# Patient Record
Sex: FEMALE | Race: WHITE | NOT HISPANIC OR LATINO | URBAN - METROPOLITAN AREA
[De-identification: names, ages, dates, MRNs, and addresses within clinical notes are randomized per-mention and may not be internally consistent; named-entity substitution may affect disease eponyms.]

---

## 2017-04-26 ENCOUNTER — OUTPATIENT (OUTPATIENT)
Dept: OUTPATIENT SERVICES | Facility: HOSPITAL | Age: 67
LOS: 1 days | Discharge: HOME | End: 2017-04-26

## 2017-04-26 DIAGNOSIS — D72.89 OTHER SPECIFIED DISORDERS OF WHITE BLOOD CELLS: ICD-10-CM

## 2017-04-30 ENCOUNTER — RESULT REVIEW (OUTPATIENT)
Age: 67
End: 2017-04-30

## 2017-05-01 ENCOUNTER — RESULT REVIEW (OUTPATIENT)
Age: 67
End: 2017-05-01

## 2017-05-01 ENCOUNTER — OUTPATIENT (OUTPATIENT)
Dept: OUTPATIENT SERVICES | Facility: HOSPITAL | Age: 67
LOS: 1 days | End: 2017-05-01
Payer: MEDICARE

## 2017-05-01 ENCOUNTER — APPOINTMENT (OUTPATIENT)
Dept: HEMATOLOGY ONCOLOGY | Facility: CLINIC | Age: 67
End: 2017-05-01

## 2017-05-01 ENCOUNTER — LABORATORY RESULT (OUTPATIENT)
Age: 67
End: 2017-05-01

## 2017-05-01 VITALS
RESPIRATION RATE: 16 BRPM | WEIGHT: 117.95 LBS | SYSTOLIC BLOOD PRESSURE: 122 MMHG | DIASTOLIC BLOOD PRESSURE: 60 MMHG | HEART RATE: 56 BPM | BODY MASS INDEX: 20.9 KG/M2 | TEMPERATURE: 97.7 F | OXYGEN SATURATION: 100 % | HEIGHT: 63.03 IN

## 2017-05-01 DIAGNOSIS — D72.89 OTHER SPECIFIED DISORDERS OF WHITE BLOOD CELLS: ICD-10-CM

## 2017-05-01 PROCEDURE — 88189 FLOWCYTOMETRY/READ 16 & >: CPT

## 2017-05-02 DIAGNOSIS — D72.829 ELEVATED WHITE BLOOD CELL COUNT, UNSPECIFIED: ICD-10-CM

## 2017-05-04 LAB — TM INTERPRETATION: SIGNIFICANT CHANGE UP

## 2017-05-11 ENCOUNTER — RESULT REVIEW (OUTPATIENT)
Age: 67
End: 2017-05-11

## 2017-05-11 ENCOUNTER — APPOINTMENT (OUTPATIENT)
Dept: HEMATOLOGY ONCOLOGY | Facility: CLINIC | Age: 67
End: 2017-05-11

## 2017-05-11 VITALS
OXYGEN SATURATION: 99 % | WEIGHT: 117.95 LBS | DIASTOLIC BLOOD PRESSURE: 60 MMHG | HEART RATE: 62 BPM | RESPIRATION RATE: 16 BRPM | SYSTOLIC BLOOD PRESSURE: 110 MMHG | BODY MASS INDEX: 20.87 KG/M2 | TEMPERATURE: 97.8 F

## 2017-05-11 LAB
BASOPHILS # BLD AUTO: 0.2 K/UL — SIGNIFICANT CHANGE UP (ref 0–0.2)
EOSINOPHIL # BLD AUTO: 0.2 K/UL — SIGNIFICANT CHANGE UP (ref 0–0.5)
HCT VFR BLD CALC: 39.5 % — SIGNIFICANT CHANGE UP (ref 34.5–45)
HGB BLD-MCNC: 13.1 G/DL — SIGNIFICANT CHANGE UP (ref 11.5–15.5)
LYMPHOCYTES # BLD AUTO: 56 % — HIGH (ref 13–44)
LYMPHOCYTES # BLD AUTO: 7.2 K/UL — HIGH (ref 1–3.3)
MCHC RBC-ENTMCNC: 29.5 PG — SIGNIFICANT CHANGE UP (ref 27–34)
MCHC RBC-ENTMCNC: 33.2 G/DL — SIGNIFICANT CHANGE UP (ref 32–36)
MCV RBC AUTO: 88.9 FL — SIGNIFICANT CHANGE UP (ref 80–100)
MONOCYTES # BLD AUTO: 0.6 K/UL — SIGNIFICANT CHANGE UP (ref 0–0.9)
MONOCYTES NFR BLD AUTO: 2 % — SIGNIFICANT CHANGE UP (ref 2–14)
NEUTROPHILS # BLD AUTO: 3.7 K/UL — SIGNIFICANT CHANGE UP (ref 1.8–7.4)
NEUTROPHILS NFR BLD AUTO: 40 % — LOW (ref 43–77)
PLAT MORPH BLD: NORMAL — SIGNIFICANT CHANGE UP
PLATELET # BLD AUTO: 186 K/UL — SIGNIFICANT CHANGE UP (ref 150–400)
RBC # BLD: 4.45 M/UL — SIGNIFICANT CHANGE UP (ref 3.8–5.2)
RBC # FLD: 12.7 % — SIGNIFICANT CHANGE UP (ref 10.3–14.5)
RBC BLD AUTO: NORMAL — SIGNIFICANT CHANGE UP
VARIANT LYMPHS # BLD: 2 % — SIGNIFICANT CHANGE UP (ref 0–6)
WBC # BLD: 11.8 K/UL — HIGH (ref 3.8–10.5)
WBC # FLD AUTO: 11.8 K/UL — HIGH (ref 3.8–10.5)

## 2017-05-11 PROCEDURE — 88184 FLOWCYTOMETRY/ TC 1 MARKER: CPT

## 2017-05-11 PROCEDURE — 88185 FLOWCYTOMETRY/TC ADD-ON: CPT

## 2017-05-11 PROCEDURE — 88275 CYTOGENETICS 100-300: CPT

## 2017-05-11 PROCEDURE — 88237 TISSUE CULTURE BONE MARROW: CPT

## 2017-05-11 PROCEDURE — 88271 CYTOGENETICS DNA PROBE: CPT

## 2017-05-19 LAB — CHROM ANALY INTERPHASE BLD FISH-IMP: SIGNIFICANT CHANGE UP

## 2017-05-23 ENCOUNTER — OUTPATIENT (OUTPATIENT)
Dept: OUTPATIENT SERVICES | Facility: HOSPITAL | Age: 67
LOS: 1 days | Discharge: HOME | End: 2017-05-23

## 2017-05-23 DIAGNOSIS — D72.829 ELEVATED WHITE BLOOD CELL COUNT, UNSPECIFIED: ICD-10-CM

## 2017-05-24 ENCOUNTER — RESULT REVIEW (OUTPATIENT)
Age: 67
End: 2017-05-24

## 2017-05-24 ENCOUNTER — APPOINTMENT (OUTPATIENT)
Dept: HEMATOLOGY ONCOLOGY | Facility: CLINIC | Age: 67
End: 2017-05-24

## 2017-05-24 VITALS
HEART RATE: 62 BPM | SYSTOLIC BLOOD PRESSURE: 110 MMHG | TEMPERATURE: 98 F | DIASTOLIC BLOOD PRESSURE: 70 MMHG | RESPIRATION RATE: 16 BRPM | WEIGHT: 119.05 LBS | BODY MASS INDEX: 21.07 KG/M2 | OXYGEN SATURATION: 100 %

## 2017-05-24 LAB
HCT VFR BLD CALC: 37.5 % — SIGNIFICANT CHANGE UP (ref 34.5–45)
HGB BLD-MCNC: 12.7 G/DL — SIGNIFICANT CHANGE UP (ref 11.5–15.5)
MCHC RBC-ENTMCNC: 30 PG — SIGNIFICANT CHANGE UP (ref 27–34)
MCHC RBC-ENTMCNC: 33.9 G/DL — SIGNIFICANT CHANGE UP (ref 32–36)
MCV RBC AUTO: 88.3 FL — SIGNIFICANT CHANGE UP (ref 80–100)
PLATELET # BLD AUTO: 199 K/UL — SIGNIFICANT CHANGE UP (ref 150–400)
RBC # BLD: 4.25 M/UL — SIGNIFICANT CHANGE UP (ref 3.8–5.2)
RBC # FLD: 12.6 % — SIGNIFICANT CHANGE UP (ref 10.3–14.5)
WBC # BLD: 10.4 K/UL — SIGNIFICANT CHANGE UP (ref 3.8–10.5)
WBC # FLD AUTO: 10.4 K/UL — SIGNIFICANT CHANGE UP (ref 3.8–10.5)

## 2017-06-13 ENCOUNTER — APPOINTMENT (OUTPATIENT)
Dept: ENDOCRINOLOGY | Facility: CLINIC | Age: 67
End: 2017-06-13

## 2017-06-13 VITALS
DIASTOLIC BLOOD PRESSURE: 70 MMHG | BODY MASS INDEX: 21.26 KG/M2 | SYSTOLIC BLOOD PRESSURE: 122 MMHG | HEIGHT: 63 IN | WEIGHT: 120 LBS | HEART RATE: 76 BPM | OXYGEN SATURATION: 98 %

## 2017-11-22 ENCOUNTER — OUTPATIENT (OUTPATIENT)
Dept: OUTPATIENT SERVICES | Facility: HOSPITAL | Age: 67
LOS: 1 days | Discharge: HOME | End: 2017-11-22

## 2017-11-22 DIAGNOSIS — D72.829 ELEVATED WHITE BLOOD CELL COUNT, UNSPECIFIED: ICD-10-CM

## 2017-11-29 ENCOUNTER — RESULT REVIEW (OUTPATIENT)
Age: 67
End: 2017-11-29

## 2017-11-29 ENCOUNTER — APPOINTMENT (OUTPATIENT)
Dept: HEMATOLOGY ONCOLOGY | Facility: CLINIC | Age: 67
End: 2017-11-29
Payer: MEDICARE

## 2017-11-29 VITALS
DIASTOLIC BLOOD PRESSURE: 70 MMHG | BODY MASS INDEX: 21.01 KG/M2 | RESPIRATION RATE: 16 BRPM | WEIGHT: 118.61 LBS | SYSTOLIC BLOOD PRESSURE: 120 MMHG | HEART RATE: 74 BPM | OXYGEN SATURATION: 98 % | TEMPERATURE: 98.4 F

## 2017-11-29 LAB
HCT VFR BLD CALC: 41 % — SIGNIFICANT CHANGE UP (ref 34.5–45)
HGB BLD-MCNC: 13.5 G/DL — SIGNIFICANT CHANGE UP (ref 11.5–15.5)
MCHC RBC-ENTMCNC: 29.1 PG — SIGNIFICANT CHANGE UP (ref 27–34)
MCHC RBC-ENTMCNC: 32.8 G/DL — SIGNIFICANT CHANGE UP (ref 32–36)
MCV RBC AUTO: 88.5 FL — SIGNIFICANT CHANGE UP (ref 80–100)
PLATELET # BLD AUTO: 186 K/UL — SIGNIFICANT CHANGE UP (ref 150–400)
RBC # BLD: 4.63 M/UL — SIGNIFICANT CHANGE UP (ref 3.8–5.2)
RBC # FLD: 13.2 % — SIGNIFICANT CHANGE UP (ref 10.3–14.5)
WBC # BLD: 11.5 K/UL — HIGH (ref 3.8–10.5)
WBC # FLD AUTO: 11.5 K/UL — HIGH (ref 3.8–10.5)

## 2017-11-29 PROCEDURE — 99215 OFFICE O/P EST HI 40 MIN: CPT

## 2017-11-30 LAB
ALBUMIN SERPL ELPH-MCNC: 4.4 G/DL
ALP BLD-CCNC: 94 U/L
ALT SERPL-CCNC: 74 U/L
ANION GAP SERPL CALC-SCNC: 11 MMOL/L
AST SERPL-CCNC: 58 U/L
BILIRUB SERPL-MCNC: 1.4 MG/DL
BUN SERPL-MCNC: 14 MG/DL
CALCIUM SERPL-MCNC: 9.5 MG/DL
CHLORIDE SERPL-SCNC: 102 MMOL/L
CO2 SERPL-SCNC: 28 MMOL/L
CREAT SERPL-MCNC: 0.74 MG/DL
GLUCOSE SERPL-MCNC: 86 MG/DL
LDH SERPL-CCNC: 226 U/L
POTASSIUM SERPL-SCNC: 4.3 MMOL/L
PROT SERPL-MCNC: 7.3 G/DL
SODIUM SERPL-SCNC: 141 MMOL/L

## 2018-02-26 ENCOUNTER — APPOINTMENT (OUTPATIENT)
Dept: HEMATOLOGY ONCOLOGY | Facility: CLINIC | Age: 68
End: 2018-02-26
Payer: MEDICARE

## 2018-02-26 VITALS
OXYGEN SATURATION: 98 % | SYSTOLIC BLOOD PRESSURE: 120 MMHG | TEMPERATURE: 97.8 F | WEIGHT: 121 LBS | RESPIRATION RATE: 14 BRPM | DIASTOLIC BLOOD PRESSURE: 72 MMHG | BODY MASS INDEX: 21.44 KG/M2 | HEART RATE: 72 BPM | HEIGHT: 63 IN

## 2018-02-26 PROCEDURE — 85025 COMPLETE CBC W/AUTO DIFF WBC: CPT

## 2018-02-26 PROCEDURE — 99215 OFFICE O/P EST HI 40 MIN: CPT

## 2018-02-27 LAB
ALBUMIN SERPL ELPH-MCNC: 4.5 G/DL
ALP BLD-CCNC: 93 U/L
ALT SERPL-CCNC: 23 U/L
ANION GAP SERPL CALC-SCNC: 12 MMOL/L
AST SERPL-CCNC: 24 U/L
BILIRUB SERPL-MCNC: 0.5 MG/DL
BUN SERPL-MCNC: 19 MG/DL
CALCIUM SERPL-MCNC: 9.5 MG/DL
CHLORIDE SERPL-SCNC: 102 MMOL/L
CO2 SERPL-SCNC: 29 MMOL/L
CREAT SERPL-MCNC: 0.73 MG/DL
DIRECT COOMBS: NORMAL
GLUCOSE SERPL-MCNC: 92 MG/DL
HAV IGG+IGM SER QL: REACTIVE
HBV CORE IGG+IGM SER QL: NONREACTIVE
HBV CORE IGM SER QL: NONREACTIVE
HBV SURFACE AB SER QL: NONREACTIVE
HBV SURFACE AG SER QL: NONREACTIVE
HCV AB SER QL: NONREACTIVE
HCV S/CO RATIO: 0.12 S/CO
LDH SERPL-CCNC: 166 U/L
POTASSIUM SERPL-SCNC: 4 MMOL/L
PROT SERPL-MCNC: 7.3 G/DL
RBC # BLD: 4.83 M/UL
RETICS # AUTO: 1.2 %
RETICS AGGREG/RBC NFR: 59.4 K/UL
SODIUM SERPL-SCNC: 143 MMOL/L
URATE SERPL-MCNC: 3.7 MG/DL

## 2018-02-28 LAB
ALBUMIN MFR SERPL ELPH: 60.7 %
ALBUMIN SERPL-MCNC: 4.4 G/DL
ALBUMIN/GLOB SERPL: 1.6 RATIO
ALPHA1 GLOB MFR SERPL ELPH: 4.4 %
ALPHA1 GLOB SERPL ELPH-MCNC: 0.3 G/DL
ALPHA2 GLOB MFR SERPL ELPH: 10.1 %
ALPHA2 GLOB SERPL ELPH-MCNC: 0.7 G/DL
B-GLOBULIN MFR SERPL ELPH: 9.8 %
B-GLOBULIN SERPL ELPH-MCNC: 0.7 G/DL
DEPRECATED KAPPA LC FREE/LAMBDA SER: 0.42 RATIO
DEPRECATED KAPPA LC FREE/LAMBDA SER: 0.45 RATIO
GAMMA GLOB FLD ELPH-MCNC: 1.1 G/DL
GAMMA GLOB MFR SERPL ELPH: 15 %
IGA SER QL IEP: 103 MG/DL
IGA SER QL IEP: 103 MG/DL
IGG SER QL IEP: 1060 MG/DL
IGG SER QL IEP: 1070 MG/DL
IGM SER QL IEP: 87 MG/DL
IGM SER QL IEP: 92 MG/DL
INTERPRETATION SERPL IEP-IMP: NORMAL
KAPPA LC CSF-MCNC: 3.32 MG/DL
KAPPA LC CSF-MCNC: 3.56 MG/DL
KAPPA LC SERPL-MCNC: 1.48 MG/DL
KAPPA LC SERPL-MCNC: 1.5 MG/DL
M PROTEIN MFR SERPL ELPH: 8.3 %
M PROTEIN SPEC IFE-MCNC: NORMAL
MONOCLON BAND OBS SERPL: 0.6 G/DL
PROT SERPL-MCNC: 7.2 G/DL
PROT SERPL-MCNC: 7.2 G/DL

## 2018-05-11 ENCOUNTER — OUTPATIENT (OUTPATIENT)
Dept: OUTPATIENT SERVICES | Facility: HOSPITAL | Age: 68
LOS: 1 days | Discharge: HOME | End: 2018-05-11

## 2018-05-11 DIAGNOSIS — D72.829 ELEVATED WHITE BLOOD CELL COUNT, UNSPECIFIED: ICD-10-CM

## 2018-05-16 ENCOUNTER — RESULT REVIEW (OUTPATIENT)
Age: 68
End: 2018-05-16

## 2018-05-16 ENCOUNTER — APPOINTMENT (OUTPATIENT)
Dept: HEMATOLOGY ONCOLOGY | Facility: CLINIC | Age: 68
End: 2018-05-16
Payer: MEDICARE

## 2018-05-16 VITALS
RESPIRATION RATE: 16 BRPM | SYSTOLIC BLOOD PRESSURE: 113 MMHG | OXYGEN SATURATION: 100 % | DIASTOLIC BLOOD PRESSURE: 73 MMHG | BODY MASS INDEX: 21.05 KG/M2 | WEIGHT: 118.83 LBS | TEMPERATURE: 98.4 F | HEART RATE: 59 BPM

## 2018-05-16 LAB
BASOPHILS # BLD AUTO: 0.2 K/UL — SIGNIFICANT CHANGE UP (ref 0–0.2)
BASOPHILS NFR BLD AUTO: 1 % — SIGNIFICANT CHANGE UP (ref 0–2)
EOSINOPHIL # BLD AUTO: 0.1 K/UL — SIGNIFICANT CHANGE UP (ref 0–0.5)
EOSINOPHIL NFR BLD AUTO: 2 % — SIGNIFICANT CHANGE UP (ref 0–6)
HCT VFR BLD CALC: 42 % — SIGNIFICANT CHANGE UP (ref 34.5–45)
HGB BLD-MCNC: 14 G/DL — SIGNIFICANT CHANGE UP (ref 11.5–15.5)
LYMPHOCYTES # BLD AUTO: 58 % — HIGH (ref 13–44)
LYMPHOCYTES # BLD AUTO: 7.1 K/UL — HIGH (ref 1–3.3)
MCHC RBC-ENTMCNC: 29.6 PG — SIGNIFICANT CHANGE UP (ref 27–34)
MCHC RBC-ENTMCNC: 33.4 G/DL — SIGNIFICANT CHANGE UP (ref 32–36)
MCV RBC AUTO: 88.6 FL — SIGNIFICANT CHANGE UP (ref 80–100)
MONOCYTES # BLD AUTO: 0.5 K/UL — SIGNIFICANT CHANGE UP (ref 0–0.9)
MONOCYTES NFR BLD AUTO: 7 % — SIGNIFICANT CHANGE UP (ref 2–14)
NEUTROPHILS # BLD AUTO: 3.1 K/UL — SIGNIFICANT CHANGE UP (ref 1.8–7.4)
NEUTROPHILS NFR BLD AUTO: 32 % — LOW (ref 43–77)
PLAT MORPH BLD: NORMAL — SIGNIFICANT CHANGE UP
PLATELET # BLD AUTO: 185 K/UL — SIGNIFICANT CHANGE UP (ref 150–400)
RBC # BLD: 4.74 M/UL — SIGNIFICANT CHANGE UP (ref 3.8–5.2)
RBC # FLD: 12.3 % — SIGNIFICANT CHANGE UP (ref 10.3–14.5)
RBC BLD AUTO: NORMAL — SIGNIFICANT CHANGE UP
WBC # BLD: 11 K/UL — HIGH (ref 3.8–10.5)
WBC # FLD AUTO: 11 K/UL — HIGH (ref 3.8–10.5)

## 2018-05-16 PROCEDURE — 99215 OFFICE O/P EST HI 40 MIN: CPT

## 2018-05-17 LAB
ALBUMIN SERPL ELPH-MCNC: 4.6 G/DL
ALP BLD-CCNC: 74 U/L
ALT SERPL-CCNC: 17 U/L
ANION GAP SERPL CALC-SCNC: 14 MMOL/L
AST SERPL-CCNC: 25 U/L
BILIRUB SERPL-MCNC: 0.8 MG/DL
BUN SERPL-MCNC: 18 MG/DL
CALCIUM SERPL-MCNC: 9.6 MG/DL
CHLORIDE SERPL-SCNC: 103 MMOL/L
CO2 SERPL-SCNC: 27 MMOL/L
CREAT SERPL-MCNC: 0.85 MG/DL
GLUCOSE SERPL-MCNC: 104 MG/DL
LDH SERPL-CCNC: 160 U/L
POTASSIUM SERPL-SCNC: 4.6 MMOL/L
PROT SERPL-MCNC: 7.2 G/DL
SODIUM SERPL-SCNC: 144 MMOL/L

## 2018-06-25 ENCOUNTER — LABORATORY RESULT (OUTPATIENT)
Age: 68
End: 2018-06-25

## 2018-06-25 ENCOUNTER — APPOINTMENT (OUTPATIENT)
Dept: ENDOCRINOLOGY | Facility: CLINIC | Age: 68
End: 2018-06-25
Payer: MEDICARE

## 2018-06-25 VITALS
HEIGHT: 63 IN | OXYGEN SATURATION: 98 % | SYSTOLIC BLOOD PRESSURE: 100 MMHG | BODY MASS INDEX: 20.55 KG/M2 | WEIGHT: 116 LBS | HEART RATE: 64 BPM | DIASTOLIC BLOOD PRESSURE: 64 MMHG

## 2018-06-25 PROCEDURE — 77080 DXA BONE DENSITY AXIAL: CPT | Mod: GA

## 2018-06-25 PROCEDURE — 99214 OFFICE O/P EST MOD 30 MIN: CPT | Mod: 25

## 2018-06-27 LAB
25(OH)D3 SERPL-MCNC: 45.6 NG/ML
ALBUMIN SERPL ELPH-MCNC: 4.4 G/DL
ALP BLD-CCNC: 75 U/L
ALT SERPL-CCNC: 19 U/L
ANION GAP SERPL CALC-SCNC: 14 MMOL/L
AST SERPL-CCNC: 26 U/L
BILIRUB SERPL-MCNC: 0.6 MG/DL
BUN SERPL-MCNC: 18 MG/DL
CALCIUM SERPL-MCNC: 9.6 MG/DL
CHLORIDE SERPL-SCNC: 104 MMOL/L
CO2 SERPL-SCNC: 28 MMOL/L
CREAT SERPL-MCNC: 0.82 MG/DL
GLUCOSE SERPL-MCNC: 102 MG/DL
POTASSIUM SERPL-SCNC: 4.7 MMOL/L
PROT SERPL-MCNC: 7.6 G/DL
SODIUM SERPL-SCNC: 146 MMOL/L
T3RU NFR SERPL: 1.04 INDEX
T4 SERPL-MCNC: 8.8 UG/DL
TSH SERPL-ACNC: 1.89 UIU/ML

## 2018-11-19 ENCOUNTER — OUTPATIENT (OUTPATIENT)
Dept: OUTPATIENT SERVICES | Facility: HOSPITAL | Age: 68
LOS: 1 days | Discharge: HOME | End: 2018-11-19

## 2018-11-19 DIAGNOSIS — D72.829 ELEVATED WHITE BLOOD CELL COUNT, UNSPECIFIED: ICD-10-CM

## 2018-11-29 ENCOUNTER — APPOINTMENT (OUTPATIENT)
Dept: HEMATOLOGY ONCOLOGY | Facility: CLINIC | Age: 68
End: 2018-11-29
Payer: MEDICARE

## 2018-11-29 ENCOUNTER — RESULT REVIEW (OUTPATIENT)
Age: 68
End: 2018-11-29

## 2018-11-29 VITALS
WEIGHT: 115.08 LBS | SYSTOLIC BLOOD PRESSURE: 118 MMHG | HEART RATE: 81 BPM | DIASTOLIC BLOOD PRESSURE: 70 MMHG | RESPIRATION RATE: 16 BRPM | OXYGEN SATURATION: 98 % | BODY MASS INDEX: 20.39 KG/M2 | TEMPERATURE: 97.8 F

## 2018-11-29 LAB
ALBUMIN SERPL ELPH-MCNC: 4.7 G/DL
ALP BLD-CCNC: 90 U/L
ALT SERPL-CCNC: 19 U/L
ANION GAP SERPL CALC-SCNC: 12 MMOL/L
AST SERPL-CCNC: 25 U/L
BILIRUB SERPL-MCNC: 0.8 MG/DL
BUN SERPL-MCNC: 18 MG/DL
CALCIUM SERPL-MCNC: 10.1 MG/DL
CHLORIDE SERPL-SCNC: 104 MMOL/L
CO2 SERPL-SCNC: 28 MMOL/L
CREAT SERPL-MCNC: 0.9 MG/DL
GLUCOSE SERPL-MCNC: 73 MG/DL
HCT VFR BLD CALC: 42.1 % — SIGNIFICANT CHANGE UP (ref 34.5–45)
HGB BLD-MCNC: 13.4 G/DL — SIGNIFICANT CHANGE UP (ref 11.5–15.5)
MCHC RBC-ENTMCNC: 28.1 PG — SIGNIFICANT CHANGE UP (ref 27–34)
MCHC RBC-ENTMCNC: 31.8 G/DL — LOW (ref 32–36)
MCV RBC AUTO: 88.3 FL — SIGNIFICANT CHANGE UP (ref 80–100)
PLATELET # BLD AUTO: 210 K/UL — SIGNIFICANT CHANGE UP (ref 150–400)
POTASSIUM SERPL-SCNC: 4.9 MMOL/L
PROT SERPL-MCNC: 7 G/DL
RBC # BLD: 4.77 M/UL — SIGNIFICANT CHANGE UP (ref 3.8–5.2)
RBC # FLD: 12.6 % — SIGNIFICANT CHANGE UP (ref 10.3–14.5)
SODIUM SERPL-SCNC: 144 MMOL/L
WBC # BLD: 13.5 K/UL — HIGH (ref 3.8–10.5)
WBC # FLD AUTO: 13.5 K/UL — HIGH (ref 3.8–10.5)

## 2018-11-29 PROCEDURE — 99214 OFFICE O/P EST MOD 30 MIN: CPT

## 2018-11-29 RX ORDER — ROSUVASTATIN CALCIUM 5 MG/1
5 TABLET, FILM COATED ORAL
Qty: 30 | Refills: 0 | Status: ACTIVE | COMMUNITY
Start: 2018-11-29

## 2018-11-29 NOTE — ASSESSMENT
[Supportive] : Goals of care discussed with patient: Supportive [Palliative Care Plan] : not applicable at this time [FreeTextEntry1] : Corinne Rosen is a 68 year old female with stage 0 CLL; She has not noted lymphadenopathy nor has she had infection. She has had an influenza vaccination and a varicella vaccination. She describes full activity level in visiting family in [facility] and [place].\par She remains without symptoms or significant progression of WBC now 13 000.\par RTC for monitor in 6 months

## 2018-11-29 NOTE — REASON FOR VISIT
[Follow-Up Visit] : a follow-up visit for [CLL] : chronic lymphocytic leukemia [FreeTextEntry2] : I am here for the follow up on the chronic leukemia

## 2018-11-29 NOTE — HISTORY OF PRESENT ILLNESS
[Disease:__________________________] : Disease: [unfilled] [Cardiovascular] : Cardiovascular [Constitutional] : Constitutional [ENT] : ENT [Dermatologic] : Dermatologic [Endocrine] : Endocrine [Gastrointestinal] : Gastrointestinal [Genitourinary] : Genitourinary [Gynecologic] : Gynecologic [Infectious] : Infectious [Musculoskeletal] : Musculoskeletal [Neurologic] : Neurologic [Pain] : Pain [Pulmonary] : Pulmonary [Hematologic] : Hematologic [Date: ____________] : Patient's last distress assessment performed on [unfilled]. [0 - No Distress] : Distress Level: 0 [de-identified] : Corinne Rosen is a 66 year old female presenting to the office for an initial evaluation of leukocytosis. She was referred by her PCP, Dr. Carmel Duran. She first noted elevated white blood cell counts for the last 4 months, ranging from 12,700 to 16,000. \par Past medical history includes history of malignant melanoma, hypothyroidism, vitamin D deficiency, vitamin B 12 deficiency, fibrocystic breast disease, osteopenia, hypercholesterolemia. \par Past surgical history includes excision of melanoma of right shoulder (approximately 1995), left breast lumpectomy - benign. 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 \par  \par  [de-identified] : 0 [de-identified] : CD 19,20 expression [FreeTextEntry1] : observation [de-identified] : She has been feeling well. No nausea and no vomiting;She has not had fever and no hospitalization since her last visit in May 2018.No weight loss

## 2018-11-30 LAB
BASOPHILS # BLD AUTO: 0.2 K/UL — SIGNIFICANT CHANGE UP (ref 0–0.2)
BASOPHILS NFR BLD AUTO: 1 % — SIGNIFICANT CHANGE UP (ref 0–2)
EOSINOPHIL # BLD AUTO: 0.1 K/UL — SIGNIFICANT CHANGE UP (ref 0–0.5)
EOSINOPHIL NFR BLD AUTO: 1 % — SIGNIFICANT CHANGE UP (ref 0–6)
LYMPHOCYTES # BLD AUTO: 62 % — HIGH (ref 13–44)
LYMPHOCYTES # BLD AUTO: 8.6 K/UL — HIGH (ref 1–3.3)
MONOCYTES # BLD AUTO: 0.6 K/UL — SIGNIFICANT CHANGE UP (ref 0–0.9)
MONOCYTES NFR BLD AUTO: 6 % — SIGNIFICANT CHANGE UP (ref 2–14)
NEUTROPHILS # BLD AUTO: 4 K/UL — SIGNIFICANT CHANGE UP (ref 1.8–7.4)
NEUTROPHILS NFR BLD AUTO: 30 % — LOW (ref 43–77)
PLAT MORPH BLD: NORMAL — SIGNIFICANT CHANGE UP
RBC BLD AUTO: NORMAL — SIGNIFICANT CHANGE UP

## 2019-05-13 ENCOUNTER — OUTPATIENT (OUTPATIENT)
Dept: OUTPATIENT SERVICES | Facility: HOSPITAL | Age: 69
LOS: 1 days | Discharge: HOME | End: 2019-05-13

## 2019-05-13 DIAGNOSIS — D72.829 ELEVATED WHITE BLOOD CELL COUNT, UNSPECIFIED: ICD-10-CM

## 2019-05-15 ENCOUNTER — RESULT REVIEW (OUTPATIENT)
Age: 69
End: 2019-05-15

## 2019-05-15 ENCOUNTER — OUTPATIENT (OUTPATIENT)
Dept: OUTPATIENT SERVICES | Facility: HOSPITAL | Age: 69
LOS: 1 days | End: 2019-05-15
Payer: MEDICARE

## 2019-05-15 ENCOUNTER — APPOINTMENT (OUTPATIENT)
Dept: HEMATOLOGY ONCOLOGY | Facility: CLINIC | Age: 69
End: 2019-05-15
Payer: MEDICARE

## 2019-05-15 VITALS
WEIGHT: 112.65 LBS | SYSTOLIC BLOOD PRESSURE: 162 MMHG | TEMPERATURE: 98.2 F | BODY MASS INDEX: 19.96 KG/M2 | HEART RATE: 61 BPM | OXYGEN SATURATION: 99 % | RESPIRATION RATE: 16 BRPM | DIASTOLIC BLOOD PRESSURE: 69 MMHG

## 2019-05-15 DIAGNOSIS — C91.10 CHRONIC LYMPHOCYTIC LEUKEMIA OF B-CELL TYPE NOT HAVING ACHIEVED REMISSION: ICD-10-CM

## 2019-05-15 LAB
BASOPHILS # BLD AUTO: 0.1 K/UL — SIGNIFICANT CHANGE UP (ref 0–0.2)
BASOPHILS NFR BLD AUTO: 1 % — SIGNIFICANT CHANGE UP (ref 0–2)
EOSINOPHIL # BLD AUTO: 0.1 K/UL — SIGNIFICANT CHANGE UP (ref 0–0.5)
EOSINOPHIL NFR BLD AUTO: 1 % — SIGNIFICANT CHANGE UP (ref 0–6)
HCT VFR BLD CALC: 40.2 % — SIGNIFICANT CHANGE UP (ref 34.5–45)
HGB BLD-MCNC: 13.7 G/DL — SIGNIFICANT CHANGE UP (ref 11.5–15.5)
LYMPHOCYTES # BLD AUTO: 61.3 % — HIGH (ref 13–44)
LYMPHOCYTES # BLD AUTO: 8.3 K/UL — HIGH (ref 1–3.3)
MCHC RBC-ENTMCNC: 29.8 PG — SIGNIFICANT CHANGE UP (ref 27–34)
MCHC RBC-ENTMCNC: 34 G/DL — SIGNIFICANT CHANGE UP (ref 32–36)
MCV RBC AUTO: 87.7 FL — SIGNIFICANT CHANGE UP (ref 80–100)
MONOCYTES # BLD AUTO: 0.6 K/UL — SIGNIFICANT CHANGE UP (ref 0–0.9)
MONOCYTES NFR BLD AUTO: 4.7 % — SIGNIFICANT CHANGE UP (ref 2–14)
NEUTROPHILS # BLD AUTO: 4.4 K/UL — SIGNIFICANT CHANGE UP (ref 1.8–7.4)
NEUTROPHILS NFR BLD AUTO: 32.1 % — LOW (ref 43–77)
PLATELET # BLD AUTO: 213 K/UL — SIGNIFICANT CHANGE UP (ref 150–400)
RBC # BLD: 4.59 M/UL — SIGNIFICANT CHANGE UP (ref 3.8–5.2)
RBC # FLD: 12.4 % — SIGNIFICANT CHANGE UP (ref 10.3–14.5)
WBC # BLD: 13.6 K/UL — HIGH (ref 3.8–10.5)
WBC # FLD AUTO: 13.6 K/UL — HIGH (ref 3.8–10.5)

## 2019-05-15 PROCEDURE — 88275 CYTOGENETICS 100-300: CPT

## 2019-05-15 PROCEDURE — 88237 TISSUE CULTURE BONE MARROW: CPT

## 2019-05-15 PROCEDURE — 88271 CYTOGENETICS DNA PROBE: CPT

## 2019-05-15 PROCEDURE — 99214 OFFICE O/P EST MOD 30 MIN: CPT

## 2019-05-15 NOTE — HISTORY OF PRESENT ILLNESS
[Disease:__________________________] : Disease: [unfilled] [Cardiovascular] : Cardiovascular [Dermatologic] : Dermatologic [ENT] : ENT [Constitutional] : Constitutional [Genitourinary] : Genitourinary [Endocrine] : Endocrine [Gastrointestinal] : Gastrointestinal [Infectious] : Infectious [Gynecologic] : Gynecologic [Neurologic] : Neurologic [Musculoskeletal] : Musculoskeletal [Pain] : Pain [Pulmonary] : Pulmonary [Hematologic] : Hematologic [Date: ____________] : Patient's last distress assessment performed on [unfilled]. [0 - No Distress] : Distress Level: 0 [de-identified] : Corinne Rosen is a 66 year old female presenting to the office for an initial evaluation of leukocytosis. She was referred by her PCP, Dr. Carmel Duran. She first noted elevated white blood cell counts for the last 4 months, ranging from 12,700 to 16,000. \par Past medical history includes history of malignant melanoma, hypothyroidism, vitamin D deficiency, vitamin B 12 deficiency, fibrocystic breast disease, osteopenia, hypercholesterolemia. \par Past surgical history includes excision of melanoma of right shoulder (approximately 1995), left breast lumpectomy - benign. 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 \par  \par  [de-identified] : CD 19,20 expression [de-identified] : 0 [de-identified] : She has been feeling well. She is planning a trip to [place] to visit inland and cruise area.\par No hospitalzation in the past six months. She is active and had skied over 30 days last winter. overall feeling of health is good. No weight change, no bleeding and no unexpected bleeding [FreeTextEntry1] : observation

## 2019-05-15 NOTE — REASON FOR VISIT
[Follow-Up Visit] : a follow-up visit for [Blood Count Assessment] : blood count assessment [CLL] : chronic lymphocytic leukemia [FreeTextEntry2] : follow up for monoclonal elevation of lymphocytes

## 2019-05-16 LAB
ALBUMIN SERPL ELPH-MCNC: 4.5 G/DL
ALP BLD-CCNC: 79 U/L
ALT SERPL-CCNC: 22 U/L
ANION GAP SERPL CALC-SCNC: 12 MMOL/L
AST SERPL-CCNC: 23 U/L
BILIRUB SERPL-MCNC: 0.9 MG/DL
BUN SERPL-MCNC: 19 MG/DL
CALCIUM SERPL-MCNC: 9.9 MG/DL
CHLORIDE SERPL-SCNC: 103 MMOL/L
CO2 SERPL-SCNC: 28 MMOL/L
CREAT SERPL-MCNC: 0.8 MG/DL
GLUCOSE SERPL-MCNC: 96 MG/DL
LDH SERPL-CCNC: 171 U/L
POTASSIUM SERPL-SCNC: 5 MMOL/L
PROT SERPL-MCNC: 6.9 G/DL
SODIUM SERPL-SCNC: 143 MMOL/L

## 2019-05-17 LAB — CHROM ANALY INTERPHASE BLD FISH-IMP: SIGNIFICANT CHANGE UP

## 2019-11-04 ENCOUNTER — OUTPATIENT (OUTPATIENT)
Dept: OUTPATIENT SERVICES | Facility: HOSPITAL | Age: 69
LOS: 1 days | Discharge: HOME | End: 2019-11-04

## 2019-11-04 DIAGNOSIS — D72.829 ELEVATED WHITE BLOOD CELL COUNT, UNSPECIFIED: ICD-10-CM

## 2019-11-13 ENCOUNTER — RX RENEWAL (OUTPATIENT)
Age: 69
End: 2019-11-13

## 2019-11-13 NOTE — REASON FOR VISIT
[Follow-Up Visit] : a follow-up visit for [Blood Count Assessment] : blood count assessment [CLL] : chronic lymphocytic leukemia

## 2019-11-14 ENCOUNTER — RESULT REVIEW (OUTPATIENT)
Age: 69
End: 2019-11-14

## 2019-11-14 ENCOUNTER — APPOINTMENT (OUTPATIENT)
Dept: HEMATOLOGY ONCOLOGY | Facility: CLINIC | Age: 69
End: 2019-11-14
Payer: MEDICARE

## 2019-11-14 VITALS
OXYGEN SATURATION: 99 % | WEIGHT: 112.44 LBS | BODY MASS INDEX: 19.92 KG/M2 | SYSTOLIC BLOOD PRESSURE: 120 MMHG | RESPIRATION RATE: 16 BRPM | TEMPERATURE: 98.3 F | HEART RATE: 66 BPM | DIASTOLIC BLOOD PRESSURE: 70 MMHG

## 2019-11-14 LAB
ALBUMIN SERPL ELPH-MCNC: 4.5 G/DL
ALP BLD-CCNC: 86 U/L
ALT SERPL-CCNC: 15 U/L
ANION GAP SERPL CALC-SCNC: 12 MMOL/L
AST SERPL-CCNC: 20 U/L
BILIRUB SERPL-MCNC: 0.6 MG/DL
BUN SERPL-MCNC: 15 MG/DL
CALCIUM SERPL-MCNC: 9.7 MG/DL
CHLORIDE SERPL-SCNC: 104 MMOL/L
CO2 SERPL-SCNC: 28 MMOL/L
CREAT SERPL-MCNC: 0.76 MG/DL
GLUCOSE SERPL-MCNC: 84 MG/DL
HCT VFR BLD CALC: 40 % — SIGNIFICANT CHANGE UP (ref 34.5–45)
HGB BLD-MCNC: 13.7 G/DL — SIGNIFICANT CHANGE UP (ref 11.5–15.5)
LDH SERPL-CCNC: 169 U/L
MCHC RBC-ENTMCNC: 31.4 PG — SIGNIFICANT CHANGE UP (ref 27–34)
MCHC RBC-ENTMCNC: 34.3 G/DL — SIGNIFICANT CHANGE UP (ref 32–36)
MCV RBC AUTO: 91.4 FL — SIGNIFICANT CHANGE UP (ref 80–100)
PLATELET # BLD AUTO: 186 K/UL — SIGNIFICANT CHANGE UP (ref 150–400)
POTASSIUM SERPL-SCNC: 4.6 MMOL/L
PROT SERPL-MCNC: 6.5 G/DL
RBC # BLD: 4.37 M/UL — SIGNIFICANT CHANGE UP (ref 3.8–5.2)
RBC # FLD: 12.3 % — SIGNIFICANT CHANGE UP (ref 10.3–14.5)
SODIUM SERPL-SCNC: 143 MMOL/L
WBC # BLD: 12.3 K/UL — HIGH (ref 3.8–10.5)
WBC # FLD AUTO: 12.3 K/UL — HIGH (ref 3.8–10.5)

## 2019-11-14 PROCEDURE — 99214 OFFICE O/P EST MOD 30 MIN: CPT

## 2019-11-14 NOTE — RESULTS/DATA
[FreeTextEntry1] : CBC WBC 12.300 HGB 13.7  000 copy of the report and the May FISH study showing chromosomal finding by FISH panel provided with educational discussion.

## 2019-11-14 NOTE — ASSESSMENT
[FreeTextEntry1] : NOE Roman is a 68 year old female with a history of non symptom burden CLL stage 0 with a review of WBC counts remaining in the range of 11.8 000 to 13 000 over several years. She has no lymphadenopathy and no hepatosplenomegaly. No detectable axillary supraclavicular or cervical lymph nodes.\par FISH panel shows chromosomal deletion in some cells but total lymphocyte count is not excessively high. I do not recommend chemotherapy for her a this time and observation is our shared agreed course. \par She is encouraged to travel safely with appropriate anti malaria prophylaxis and appropriate hepatitis prophylaxis. Avoidance of non purified water sources and she will consult with travel recommendations on internet.\par  RTC 6 months.

## 2019-11-14 NOTE — HISTORY OF PRESENT ILLNESS
[Disease:__________________________] : Disease: [unfilled] [Cardiovascular] : Cardiovascular [Constitutional] : Constitutional [ENT] : ENT [Dermatologic] : Dermatologic [Endocrine] : Endocrine [Gastrointestinal] : Gastrointestinal [Genitourinary] : Genitourinary [Gynecologic] : Gynecologic [Infectious] : Infectious [Musculoskeletal] : Musculoskeletal [Neurologic] : Neurologic [Pain] : Pain [Pulmonary] : Pulmonary [Hematologic] : Hematologic [Date: ____________] : Patient's last distress assessment performed on [unfilled]. [0 - No Distress] : Distress Level: 0 [de-identified] : Corinne Rosen is a 68 year old female presenting to the office for an initial evaluation of leukocytosis. She was referred by her PCP, Dr. Carmel Duran. She first noted elevated white blood cell counts for the last 4 months, ranging from 12,700 to 16,000. \par Past medical history includes history of malignant melanoma, hypothyroidism, vitamin D deficiency, vitamin B 12 deficiency, fibrocystic breast disease, osteopenia, hypercholesterolemia. \par Past surgical history includes excision of melanoma of right shoulder (approximately 1995), left breast lumpectomy - benign. 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 \par  \par  [de-identified] : 0 [de-identified] : CD 19,20 expression [FreeTextEntry1] : observation [de-identified] : Radha has felt well. No hospitalzation and she has had full activity level since her last visit in May 2019.She is residing in [place] with her  and a ospina retriever pet.  No new lymph adenopathy, no unexpected fatigue, no unexpected bruising or bleeding. No fever and no weight loss. She is planning a trip to [place] and [facility] to tour [place] and [employer]. She request opionion on travel prophylaxis.

## 2019-11-15 LAB
EOSINOPHIL # BLD AUTO: 0.2 K/UL — SIGNIFICANT CHANGE UP (ref 0–0.5)
EOSINOPHIL NFR BLD AUTO: 4 % — SIGNIFICANT CHANGE UP (ref 0–6)
LYMPHOCYTES # BLD AUTO: 57 % — HIGH (ref 13–44)
LYMPHOCYTES # BLD AUTO: 7.2 K/UL — HIGH (ref 1–3.3)
MONOCYTES # BLD AUTO: 0.6 K/UL — SIGNIFICANT CHANGE UP (ref 0–0.9)
MONOCYTES NFR BLD AUTO: 6 % — SIGNIFICANT CHANGE UP (ref 2–14)
NEUTROPHILS # BLD AUTO: 4.1 K/UL — SIGNIFICANT CHANGE UP (ref 1.8–7.4)
NEUTROPHILS NFR BLD AUTO: 33 % — LOW (ref 43–77)
PLAT MORPH BLD: NORMAL — SIGNIFICANT CHANGE UP
RBC BLD AUTO: SIGNIFICANT CHANGE UP

## 2020-05-28 ENCOUNTER — OUTPATIENT (OUTPATIENT)
Dept: OUTPATIENT SERVICES | Facility: HOSPITAL | Age: 70
LOS: 1 days | Discharge: HOME | End: 2020-05-28

## 2020-05-28 DIAGNOSIS — D72.89 OTHER SPECIFIED DISORDERS OF WHITE BLOOD CELLS: ICD-10-CM

## 2020-06-02 ENCOUNTER — APPOINTMENT (OUTPATIENT)
Dept: HEMATOLOGY ONCOLOGY | Facility: CLINIC | Age: 70
End: 2020-06-02
Payer: MEDICARE

## 2020-06-02 VITALS — SYSTOLIC BLOOD PRESSURE: 117 MMHG | DIASTOLIC BLOOD PRESSURE: 52 MMHG

## 2020-06-02 PROCEDURE — 99215 OFFICE O/P EST HI 40 MIN: CPT | Mod: 95

## 2020-06-04 NOTE — ASSESSMENT
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 She has no active symptoms and she is under surveillance. No neurtopenia and no anemia or thrombocytopenia; she has a normal LDH. We are unable to obtain data regarding IgH status or p 53 mutation and it is permissible to follow her at stage 0 rater than recommending oral BTK or using chemoimmunotherapy.\par She has had an uneventful 6 months living in [place]. I will call her primary physician (currently the office is closed ) Dr Young to obtain recent blood studies.\par Discussion of her elevated risk of viral infection and recommendation for influenza vaccination in fall and corona virus vaccination (untested as of yet) if available in the fall of 2019; she is using mask and practicing recommended social distancing\par I will call her when the results are available to me. Follow up in 6 months

## 2020-06-04 NOTE — HISTORY OF PRESENT ILLNESS
[Home] : at home, [unfilled] , at the time of the visit. [Medical Office: ([facility])___] : at the medical office located in  [Disease:__________________________] : Disease: [unfilled] [Date: ____________] : Patient's last distress assessment performed on [unfilled]. [0 - No Distress] : Distress Level: 0 [Treatment Protocol] : Treatment Protocol [Cardiovascular] : Cardiovascular [Constitutional] : Constitutional [Dermatologic] : Dermatologic [ENT] : ENT [Gastrointestinal] : Gastrointestinal [Genitourinary] : Genitourinary [Endocrine] : Endocrine [Infectious] : Infectious [Musculoskeletal] : Musculoskeletal [Gynecologic] : Gynecologic [Pain] : Pain [Neurologic] : Neurologic [Pulmonary] : Pulmonary [Hematologic] : Hematologic [de-identified] : Corinne Rosen is a 69 year old female presenting to the office for an initial evaluation of leukocytosis. She was referred by her PCP, Dr. Carmel Duran. She first noted elevated white blood cell counts for the last 4 months, ranging from 12,700 to 16,000. \par Past medical history includes history of malignant melanoma, hypothyroidism, vitamin D deficiency, vitamin B 12 deficiency, fibrocystic breast disease, osteopenia, hypercholesterolemia. \par Past surgical history includes excision of melanoma of right shoulder (approximately 1995), left breast lumpectomy - benign. 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 \par  \par  [de-identified] : 0 [FreeTextEntry1] : observation [de-identified] : CD 19,20 expression [de-identified] : She has had no hospitalzation and no fever and no known viral infections. She is eating well; no loss of taste or exposure to people with respiratory illness.\par She is following social distancing guidelines and wears a mask. She is aware of her increased risk to viral infection

## 2020-06-04 NOTE — REVIEW OF SYSTEMS
[Fever] : no fever [Night Sweats] : no night sweats [Chills] : no chills [Fatigue] : no fatigue [Recent Change In Weight] : ~T no recent weight change [Eye Pain] : no eye pain [Dry Eyes] : no dryness of the eyes [Red Eyes] : eyes not red [Vision Problems] : no vision problems [Dysphagia] : no dysphagia [Loss of Hearing] : no loss of hearing [Nosebleeds] : no nosebleeds [Hoarseness] : no hoarseness [Odynophagia] : no odynophagia [Mucosal Pain] : no mucosal pain [Chest Pain] : no chest pain [Palpitations] : no palpitations [Leg Claudication] : no intermittent leg claudication [Wheezing] : no wheezing [Lower Ext Edema] : no lower extremity edema [Shortness Of Breath] : no shortness of breath [SOB on Exertion] : no shortness of breath during exertion [Cough] : no cough [Abdominal Pain] : no abdominal pain [Vomiting] : no vomiting [Constipation] : no constipation [Diarrhea] : no diarrhea [Incontinence] : no incontinence [Dysuria] : no dysuria [Dysmenorrhea/Abn Vaginal Bleeding] : no dysmenorrhea/abnormal vaginal bleeding [Joint Pain] : no joint pain [Muscle Pain] : no muscle pain [Joint Stiffness] : no joint stiffness [Skin Wound] : no skin wound [Skin Rash] : no skin rash [Confused] : no confusion [Dizziness] : no dizziness [Difficulty Walking] : no difficulty walking [Fainting] : no fainting [Insomnia] : no insomnia [Suicidal] : not suicidal [Anxiety] : no anxiety [Depression] : no depression [Hot Flashes] : no hot flashes [Proptosis] : no proptosis [Muscle Weakness] : no muscle weakness [Easy Bleeding] : no tendency for easy bleeding [Deepening Of The Voice] : no deepening of the voice [Easy Bruising] : no tendency for easy bruising [Swollen Glands] : no swollen glands

## 2020-06-04 NOTE — PHYSICAL EXAM
[Fully active, able to carry on all pre-disease performance without restriction] : Status 0 - Fully active, able to carry on all pre-disease performance without restriction [Normal] : affect appropriate [Ulcers] : no ulcers [Mucositis] : no mucositis [Thrush] : no thrush [de-identified] : as seen normal resipatory rate [Vesicles] : no vesicles [de-identified] : as seen [de-identified] : as seen [de-identified] : as seen; she provides her blood pressure daily monitor history recorded [de-identified] : as seen [de-identified] : as seen [de-identified] : as sen [de-identified] : as seen [de-identified] : as seen

## 2020-06-04 NOTE — OB HISTORY
[Currently In Menopause] : currently in menopause [Menstrual Cramps] : no menstrual cramps [Regular Cycle Intervals] : periods have been irregular

## 2021-02-01 ENCOUNTER — OUTPATIENT (OUTPATIENT)
Dept: OUTPATIENT SERVICES | Facility: HOSPITAL | Age: 71
LOS: 1 days | Discharge: HOME | End: 2021-02-01

## 2021-02-01 DIAGNOSIS — D72.89 OTHER SPECIFIED DISORDERS OF WHITE BLOOD CELLS: ICD-10-CM

## 2021-02-04 ENCOUNTER — APPOINTMENT (OUTPATIENT)
Dept: HEMATOLOGY ONCOLOGY | Facility: CLINIC | Age: 71
End: 2021-02-04
Payer: MEDICARE

## 2021-02-04 ENCOUNTER — TRANSCRIPTION ENCOUNTER (OUTPATIENT)
Age: 71
End: 2021-02-04

## 2021-02-04 PROCEDURE — 99443: CPT | Mod: 95

## 2021-02-07 NOTE — RESULTS/DATA
[FreeTextEntry1] : reveiew of data from primary care physician Dr Hannah logan 01/22/2021:\par CBC WBC 16.21 HGB 13.6  000 differential neutrophil 24% lymphocyte 69 % mono 4.4 %; atypical lymph listed at 12 %\par CMP creatine 0.8 Albumin 4.5  (normal) normal serum electolytes

## 2021-02-07 NOTE — HISTORY OF PRESENT ILLNESS
[Home] : at home, [unfilled] , at the time of the visit. [Medical Office: ([facility])___] : at the medical office located in  [Disease:__________________________] : Disease: [unfilled] [Date: ____________] : Patient's last distress assessment performed on [unfilled]. [0 - No Distress] : Distress Level: 0 [de-identified] : Corinne Rosen is a 70 year old female presenting to the office for an initial evaluation of leukocytosis. She was referred by her PCP, Dr. Carmel Duran. She first noted elevated white blood cell counts for the last 4 months, ranging from 12,700 to 16,000. \par Past medical history includes history of malignant melanoma, hypothyroidism, vitamin D deficiency, vitamin B 12 deficiency, fibrocystic breast disease, osteopenia, hypercholesterolemia. \par Past surgical history includes excision of melanoma of right shoulder (approximately 1995), left breast lumpectomy - benign. 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 \par  \par  [de-identified] : 0 [de-identified] : CD 19,20 expression [FreeTextEntry1] : observation [de-identified] : Radha has been feeling well; there was one episode of a viral syndrome approximately 2 months ago. She was tested negative for COVID 19 and she had an uneventful recovery. She is participating in sporting activity such as light skiing at her residence. \par There si no history of weight loss or other change of health. Her appetitie has been good\par She has to wait for the cohort less than 75 to complete in [place]; she is planning for a vaccination against COVID 19. She wears masks and she is following CDC guidelines for risk mitigation against the virus

## 2021-02-07 NOTE — PHYSICAL EXAM
[Fully active, able to carry on all pre-disease performance without restriction] : Status 0 - Fully active, able to carry on all pre-disease performance without restriction [de-identified] : no video image available

## 2021-02-07 NOTE — ASSESSMENT
[Supportive] : Goals of care discussed with patient: Supportive [Palliative Care Plan] : not applicable at this time [FreeTextEntry1] : NOE Roman is a 70 year old female with Mathis Binet Stage 0 Chronic Lymphocytic Leukemia. Her white cell (lymphocyte count) has remained in the same range as previously noted with an absolute total whiter cell count increasing by about 2000 cells. The current percentage of lymphocytes is 69% thus making her total lymphocyte count at approximately 48[address]. She has a normal HGB and a normal platelet count and we have agreed to have observation at this time. THere have been no hematological complications and she has not experienced infection. I have encouraged her to have vaccination against COVID 19 as she remains in a relative state of immune deficiency.\par I will request that she have a CBC performed in 6 months with level of serum immune globulins obtained.\par Overall management now is observation in the setting of a white cell count less than 20 000 and normal HGB and platelet count. \par \par We attempted to have a TEB telehealth visit using [facility] roel; Although I was able to send a video image to Radha, she was not able to send her video image. Consequently visit was changed to a telephonic visit.

## 2021-02-07 NOTE — REASON FOR VISIT
[Follow-Up Visit] : a follow-up visit for [CLL] : chronic lymphocytic leukemia [FreeTextEntry2] : review of blood testing and periodic review

## 2021-07-18 ENCOUNTER — OUTPATIENT (OUTPATIENT)
Dept: OUTPATIENT SERVICES | Facility: HOSPITAL | Age: 71
LOS: 1 days | Discharge: HOME | End: 2021-07-18

## 2021-07-18 DIAGNOSIS — D72.829 ELEVATED WHITE BLOOD CELL COUNT, UNSPECIFIED: ICD-10-CM

## 2021-07-20 ENCOUNTER — RESULT REVIEW (OUTPATIENT)
Age: 71
End: 2021-07-20

## 2021-07-20 ENCOUNTER — APPOINTMENT (OUTPATIENT)
Dept: HEMATOLOGY ONCOLOGY | Facility: CLINIC | Age: 71
End: 2021-07-20
Payer: MEDICARE

## 2021-07-20 VITALS
RESPIRATION RATE: 16 BRPM | TEMPERATURE: 97.9 F | BODY MASS INDEX: 19.29 KG/M2 | WEIGHT: 110.23 LBS | HEIGHT: 63.39 IN | OXYGEN SATURATION: 99 % | SYSTOLIC BLOOD PRESSURE: 166 MMHG | HEART RATE: 54 BPM | DIASTOLIC BLOOD PRESSURE: 71 MMHG

## 2021-07-20 LAB
ALBUMIN SERPL ELPH-MCNC: 4.6 G/DL
ALP BLD-CCNC: 76 U/L
ALT SERPL-CCNC: 21 U/L
ANION GAP SERPL CALC-SCNC: 10 MMOL/L
AST SERPL-CCNC: 26 U/L
BASOPHILS # BLD AUTO: 0.1 K/UL — SIGNIFICANT CHANGE UP (ref 0–0.2)
BASOPHILS NFR BLD AUTO: 1 % — SIGNIFICANT CHANGE UP (ref 0–2)
BILIRUB SERPL-MCNC: 1.3 MG/DL
BUN SERPL-MCNC: 20 MG/DL
CALCIUM SERPL-MCNC: 9.3 MG/DL
CHLORIDE SERPL-SCNC: 105 MMOL/L
CO2 SERPL-SCNC: 25 MMOL/L
COVID-19 SPIKE DOMAIN ANTIBODY INTERPRETATION: POSITIVE
CREAT SERPL-MCNC: 0.86 MG/DL
EOSINOPHIL # BLD AUTO: 0 K/UL — SIGNIFICANT CHANGE UP (ref 0–0.5)
EOSINOPHIL NFR BLD AUTO: 0 % — SIGNIFICANT CHANGE UP (ref 0–6)
GLUCOSE SERPL-MCNC: 90 MG/DL
HCT VFR BLD CALC: 40.2 % — SIGNIFICANT CHANGE UP (ref 34.5–45)
HGB BLD-MCNC: 12.9 G/DL — SIGNIFICANT CHANGE UP (ref 11.5–15.5)
LYMPHOCYTES # BLD AUTO: 5.67 K/UL — HIGH (ref 1–3.3)
LYMPHOCYTES # BLD AUTO: 55 % — HIGH (ref 13–44)
MCHC RBC-ENTMCNC: 30 PG — SIGNIFICANT CHANGE UP (ref 27–34)
MCHC RBC-ENTMCNC: 32.1 G/DL — SIGNIFICANT CHANGE UP (ref 32–36)
MCV RBC AUTO: 93.5 FL — SIGNIFICANT CHANGE UP (ref 80–100)
MONOCYTES # BLD AUTO: 0.82 K/UL — SIGNIFICANT CHANGE UP (ref 0–0.9)
MONOCYTES NFR BLD AUTO: 8 % — SIGNIFICANT CHANGE UP (ref 2–14)
NEUTROPHILS # BLD AUTO: 3.61 K/UL — SIGNIFICANT CHANGE UP (ref 1.8–7.4)
NEUTROPHILS NFR BLD AUTO: 35 % — LOW (ref 43–77)
NRBC # BLD: 0 /100 — SIGNIFICANT CHANGE UP (ref 0–0)
NRBC # BLD: SIGNIFICANT CHANGE UP /100 WBCS (ref 0–0)
PLAT MORPH BLD: NORMAL — SIGNIFICANT CHANGE UP
PLATELET # BLD AUTO: 180 K/UL — SIGNIFICANT CHANGE UP (ref 150–400)
POTASSIUM SERPL-SCNC: 4.1 MMOL/L
PROT SERPL-MCNC: 6.5 G/DL
RBC # BLD: 4.3 M/UL — SIGNIFICANT CHANGE UP (ref 3.8–5.2)
RBC # FLD: 13.9 % — SIGNIFICANT CHANGE UP (ref 10.3–14.5)
RBC BLD AUTO: SIGNIFICANT CHANGE UP
SARS-COV-2 AB SERPL IA-ACNC: >250 U/ML
SODIUM SERPL-SCNC: 140 MMOL/L
VARIANT LYMPHS # BLD: 1 % — SIGNIFICANT CHANGE UP (ref 0–6)
WBC # BLD: 10.31 K/UL — SIGNIFICANT CHANGE UP (ref 3.8–10.5)
WBC # FLD AUTO: 10.31 K/UL — SIGNIFICANT CHANGE UP (ref 3.8–10.5)

## 2021-07-20 PROCEDURE — 99215 OFFICE O/P EST HI 40 MIN: CPT

## 2021-07-22 NOTE — ASSESSMENT
[Supportive] : Goals of care discussed with patient: Supportive [Palliative Care Plan] : not applicable at this time [FreeTextEntry1] : NOE Roman is a 70 year old female with Mathis Binet Stage 0 Chronic Lymphocytic Leukemia. Her white cell (lymphocyte count) has remained in the same range as previously noted with an absolute total whiter cell count increasing by about 2000 cells. Prior CBC 1/22/21 with WBC 16.2 lymph 69.6% with a normal hemoglobin and platelet count. Today she has WBC 10.31 Hb 12.9 Plt 180. She has a normal HGB and a normal platelet count and we have agreed to have observation at this time. There have been no hematological complications and she has not experienced infection. She has received the COVID19 vaccination. I will request that she have a CBC performed in 6 months with level of serum immune globulins obtained.\par Overall management now is observation in the setting of a white cell count less than 20 000 and normal HGB and platelet count. COVID19 antibodies.

## 2021-07-22 NOTE — HISTORY OF PRESENT ILLNESS
[Disease:__________________________] : Disease: [unfilled] [Date: ____________] : Patient's last distress assessment performed on [unfilled]. [0 - No Distress] : Distress Level: 0 [Cardiovascular] : Cardiovascular [Constitutional] : Constitutional [ENT] : ENT [Dermatologic] : Dermatologic [Endocrine] : Endocrine [Gastrointestinal] : Gastrointestinal [Gynecologic] : Gynecologic [Genitourinary] : Genitourinary [Infectious] : Infectious [Musculoskeletal] : Musculoskeletal [Neurologic] : Neurologic [Pain] : Pain [Pulmonary] : Pulmonary [Hematologic] : Hematologic [de-identified] : Corinne Rosen is a 70 year old female presenting to the office for an initial evaluation of leukocytosis. She was referred by her PCP, Dr. Carmel Duran. She first noted elevated white blood cell counts for the last 4 months, ranging from 12,700 to 16,000. \par Past medical history includes history of malignant melanoma, hypothyroidism, vitamin D deficiency, vitamin B 12 deficiency, fibrocystic breast disease, osteopenia, hypercholesterolemia. \par Past surgical history includes excision of melanoma of right shoulder (approximately 1995), left breast lumpectomy - benign. 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 \par  \par  [de-identified] : 0 [de-identified] : CD 19,20 expression [FreeTextEntry1] : observation [de-identified] : Patient states she is feeling well. No specific complaints. She has plans to possibly travel to [place] in September. She has received the COVID vaccine. Today her CBC showing WBC 10.31, Hb 12.9, Plt 180. No weight loss or change in appetite. Requesting antibody test for COVID-19. \par \par \par \par

## 2021-07-22 NOTE — END OF VISIT
[>50% of the face to face encounter time was spent on counseling and/or coordination of care for ___] : Greater than 50% of the face to face encounter time was spent on counseling and/or coordination of care for [unfilled] [] : Fellow [Time Spent: ___ minutes] : I have spent [unfilled] minutes of time on the encounter. [FreeTextEntry3] : as stated above continue observation in treatment of CLL

## 2021-07-27 ENCOUNTER — TRANSCRIPTION ENCOUNTER (OUTPATIENT)
Age: 71
End: 2021-07-27

## 2021-07-27 LAB
ALBUMIN MFR SERPL ELPH: 63.6 %
ALBUMIN SERPL-MCNC: 4.1 G/DL
ALBUMIN/GLOB SERPL: 1.7 RATIO
ALPHA1 GLOB MFR SERPL ELPH: 4.2 %
ALPHA1 GLOB SERPL ELPH-MCNC: 0.3 G/DL
ALPHA2 GLOB MFR SERPL ELPH: 9.7 %
ALPHA2 GLOB SERPL ELPH-MCNC: 0.6 G/DL
B-GLOBULIN MFR SERPL ELPH: 9.1 %
B-GLOBULIN SERPL ELPH-MCNC: 0.6 G/DL
GAMMA GLOB FLD ELPH-MCNC: 0.9 G/DL
GAMMA GLOB MFR SERPL ELPH: 13.4 %
INTERPRETATION SERPL IEP-IMP: NORMAL
M PROTEIN MFR SERPL ELPH: 7.2 %
MONOCLON BAND OBS SERPL: 0.5 G/DL
PROT SERPL-MCNC: 6.5 G/DL
PROT SERPL-MCNC: 6.5 G/DL

## 2022-01-18 ENCOUNTER — OUTPATIENT (OUTPATIENT)
Dept: OUTPATIENT SERVICES | Facility: HOSPITAL | Age: 72
LOS: 1 days | Discharge: HOME | End: 2022-01-18

## 2022-01-18 DIAGNOSIS — D72.89 OTHER SPECIFIED DISORDERS OF WHITE BLOOD CELLS: ICD-10-CM

## 2022-01-20 ENCOUNTER — APPOINTMENT (OUTPATIENT)
Dept: HEMATOLOGY ONCOLOGY | Facility: CLINIC | Age: 72
End: 2022-01-20
Payer: MEDICARE

## 2022-01-20 PROCEDURE — 99215 OFFICE O/P EST HI 40 MIN: CPT | Mod: 95

## 2022-01-23 NOTE — HISTORY OF PRESENT ILLNESS
[Home] : at home, [unfilled] , at the time of the visit. [Medical Office: ([facility])___] : at the medical office located in  [Spouse] : spouse [Verbal consent obtained from patient] : the patient, [unfilled] [Disease:__________________________] : Disease: [unfilled] [Date: ____________] : Patient's last distress assessment performed on [unfilled]. [0 - No Distress] : Distress Level: 0 [de-identified] : Corinne Rosen is a 71 year old female presenting to the office for an initial evaluation of leukocytosis. She was referred by her PCP, Dr. Carmel Duran. She first noted elevated white blood cell counts for the last 4 months, ranging from 12,700 to 16,000. \par Past medical history includes history of malignant melanoma, hypothyroidism, vitamin D deficiency, vitamin B 12 deficiency, fibrocystic breast disease, osteopenia, hypercholesterolemia. \par Past surgical history includes excision of melanoma of right shoulder (approximately 1995), left breast lumpectomy - benign. 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 \par  \par  [de-identified] : 0 [de-identified] : CD 19,20 expression [FreeTextEntry1] : observation [de-identified] : The patient has been feeling well over past six months . she is fully active and may ski today. No hospitalzation; wears mask and full COVID avoidance stagiest. No transfusions or illness. \par Discussion with primary care physician about bone strengthening medication (possibly bisphosphonates) . She was receiving estrogen briefly but had a non malignant uterine polyp.\par May use vitamin D

## 2022-01-23 NOTE — PHYSICAL EXAM
[Fully active, able to carry on all pre-disease performance without restriction] : Status 0 - Fully active, able to carry on all pre-disease performance without restriction [Normal] : affect appropriate [de-identified] : as seen [de-identified] : as seen [de-identified] : as seen [de-identified] : as seen [de-identified] : as seen [de-identified] : as seen [de-identified] : as seen [de-identified] : as seen [de-identified] : as seen [de-identified] : as seen [de-identified] : as seen [de-identified] : as seen [de-identified] : as seen [de-identified] : as seen

## 2022-01-23 NOTE — ASSESSMENT
[Supportive] : Goals of care discussed with patient: Supportive [Palliative Care Plan] : not applicable at this time [FreeTextEntry1] : NOE Roman is a 70 year old female with Mathis Binet Stage 0 Chronic Lymphocytic Leukemia.\par  Her white cell (lymphocyte count) has remained in the same range as previously noted with an absolute total whiter cell count increasing by about 2000 cells. Prior CBC s 1/22/21 with WBC 16.2 lymph 69.6% with a normal hemoglobin and platelet count.\par 07/20/2021 she has WBC 10.31 Hb 12.9 Plt 180. She has a normal HGB and a normal platelet count.\par she has immune reactivity to COVID 19 spike domain antibody. SPEP show M spike 0.5 post vaccination\par 01/14/2022 CBC WBC 13.8 absolute neutrophil 4.380  HGB 13  000 normal LDH (outside laboratory result)\par  \par Overall management now is observation in the setting of a white cell count less than 20 000 and normal HGB and platelet count.\par She has had two vaccinations and booster and she has been wearing masks and practices risk avoidance techniques .\par \par Continue observation as she has not shown progression of lymphocyte count over 3 years and is not anemic or thrombocytopenia. If counts were to dramatically increase may consider bone marrow biopsy to assess for proliferative gene mutations (MYD 88 etc) \par RTC 6 months

## 2022-01-23 NOTE — RESULTS/DATA
[FreeTextEntry1] : review of information sent to me from [place]\par CBC WBC 13.8 absolute neutrophil 4.390 lymph percentage 62.4 %.normal 59% HGB 13.0  000

## 2022-02-17 ENCOUNTER — APPOINTMENT (OUTPATIENT)
Dept: ENDOCRINOLOGY | Facility: CLINIC | Age: 72
End: 2022-02-17
Payer: MEDICARE

## 2022-02-17 VITALS
TEMPERATURE: 97.7 F | WEIGHT: 114 LBS | BODY MASS INDEX: 19.95 KG/M2 | HEART RATE: 68 BPM | OXYGEN SATURATION: 99 % | DIASTOLIC BLOOD PRESSURE: 70 MMHG | HEIGHT: 63.39 IN | SYSTOLIC BLOOD PRESSURE: 140 MMHG

## 2022-02-17 DIAGNOSIS — Z87.39 PERSONAL HISTORY OF OTHER DISEASES OF THE MUSCULOSKELETAL SYSTEM AND CONNECTIVE TISSUE: ICD-10-CM

## 2022-02-17 PROCEDURE — 99204 OFFICE O/P NEW MOD 45 MIN: CPT | Mod: 25

## 2022-02-17 PROCEDURE — ZZZZZ: CPT

## 2022-02-17 PROCEDURE — 77080 DXA BONE DENSITY AXIAL: CPT

## 2022-02-18 NOTE — REASON FOR VISIT
[Consultation] : a consultation visit [Osteoporosis] : osteoporosis [FreeTextEntry2] : Preston Young MD

## 2022-02-18 NOTE — END OF VISIT
[FreeTextEntry3] : I, Malcolm Bragg, authored this note working as a medical scribe for Dr. Landeros.  02/17/2022.  1:30PM. This note was authored by the medical scribe for me. I have reviewed, edited, and revised the note as needed. I am in agreement with the exam findings, imaging findings, and treatment plan.  Arun Landeros MD

## 2022-02-18 NOTE — PROCEDURE
[FreeTextEntry1] : Bone mineral density: 02/17/2022 \par Indication: vs. 2018\par Spine: -2.5 osteoporosis, -4.4%\par Total hip: -2.6 osteoporosis, -3.7%\par Femoral neck: -2.5 osteoporosis, -6.7%\par R Proximal radius: -2.7 osteoporosis, -8.1%\par \par Bone mineral density: 06/25/2018 \par Indication: vs. 6/2016\par Spine: -2.1 osteopenia, no significant change \par Total hip: -2.4 osteopenia, no significant change  \par Femoral neck: -2.2 osteopenia, no significant change \par Proximal radius: - 20 osteopenia, no significant change \par \par Bone mineral density June 7, 2016\par Spine -2.0, osteopenia\par Total hip - 2.3, osteopenia\par Femoral neck -2.2, osteopenia\par Proximal radius -1.8, osteopenia

## 2022-02-18 NOTE — CONSULT LETTER
[Dear  ___] : Dear  [unfilled], [Consult Letter:] : I had the pleasure of evaluating your patient, [unfilled]. [Please see my note below.] : Please see my note below. [Consult Closing:] : Thank you very much for allowing me to participate in the care of this patient.  If you have any questions, please do not hesitate to contact me. [FreeTextEntry2] : Preston Young MD\par [address], \[place] [address]\par [phone]

## 2022-02-18 NOTE — ASSESSMENT
[Denosumab Therapy] : Risks  and benefits of denosumab therapy were discussed with the patient including eczema, cellulitis, osteonecrosis of the jaw and atypical femur fractures [Bisphosphonate Therapy] : Risks and benefits of bisphosphonate therapy were  discussed with the patient including gastroesophageal irritation, osteonecrosis of the jaw, and atypical femur fractures, and acute phase reaction [Bisphosphonates] : The patient was instructed to take bisphosphonates on an empty stomach with a full glass of water,and wait at least 30 minutes before eating or lying down [Levothyroxine] : The patient was instructed to take Levothyroxine on an empty stomach, separate from vitamins, and wait at least 30 minutes before eating [FreeTextEntry1] : 71 year-old female with history of low bone density and hypothyroidism\par Last seen in 2018, pt lives in [place].\par \par Previously on Fosamax for many years. BMD 6/2018 is consistent with osteopenia and stable off Fosamax versus 2016. I recommend a continued drug holiday. Outside BMD 8/2021 decreased at all sites but indicated severely low osteoporosis in proximal radius, images not available. Repeat BMD 2/2022 in office shows worsened now osteoporosis at all sites, the proximal radius is not severely low. BMD results reviewed w/ pt.\par \par Patient advised for an increased risk of future fx. Options for medical therapy discussed in detail. I discussed rx with bisphosphonate therapy, including Fosamax, Actonel, Boniva, and IV Reclast. Risks and benefits of bisphosphonate therapy were discussed with the patient including minor aches & pains, heartburn, gastroesophageal irritation (excluding IV Reclast), osteonecrosis of the jaw, atypical femur fractures, and acute phase reaction (w/ IV Reclast only). Reviewed recent research with IV Reclast which looked at 18 month cycles of dosing instead of 12 months, with 6 year safety data ([facility] 2018). Pt would benefit from bisphosphonate therapy with the expectation of a drug holiday within 5 years. I discussed twice a year Prolia. Risks and benefits discussed including thigh pain, interval fx, and ONJ. I discussed that pt cannot stop Prolia without expecting rapid bone loss and increase in risk for future fx unless they transition to another rx. Furthermore, there is 10 year safety data for Prolia. All questions were answered. Rx information handout provided. Pt understands and elects to restart Actonel. Medication instructions reviewed. Prescription sent out. \par \par Hypothyroidism. Currently on LT4 50 mcg 1/2 tablet 4 days, full tablet 3 days. No local neck pain. No dysphagia or dysphonia. No raciness, shakiness, heat/cold intolerance, tiredness, or fatigue. No palpitations, tremors, or sudden weight gain/loss.\par \par Labs 1/2022 reviewed: Ca 9.6, normal. Creatinine 0.8, normal\par \par Pt will send me more up-to-date labs.\par \par F/u in 1 year w/ BMD\par \par Pharmacological Management of Osteoporosis in Postmenopausal Women: An Endocrine Society  Clinical Practice Guideline. Sloane R, Abel PLEITEZ., Nixon DM, Robert AM, Suzette MH, Suzette D. JCEM 2019 104: [phone]

## 2022-02-18 NOTE — HISTORY OF PRESENT ILLNESS
Is This A New Presentation, Or A Follow-Up?: Skin Lesions How Severe Is Your Skin Lesion?: moderate [Risedronate (Actonel)] : Risedronate [FreeTextEntry1] : \par Last seen in 2018, pt lives in [place].\par No significant interval health changes. No interval hospitalizations, fractures, or change in medications.\par On Actonel for 12 years, stopped ~2014. Was still on a drug holiday. Had toe fracture 11/2014, no other fractures. Previous rib fx. H/o anorexia at age 19 for ~1 year. Fh/o osteoporosis in mother, hip fx. Pt takes Ca 500 mg daily.\par  Pt told of rapid decrease in BMD. \par Bone mineral density: 8/2021, images not available\par Spine: -2.4 osteopenia, prior -2.1\par Total hip: -2.7 osteoporosis, -2.4 osteopenia\par Femoral neck: -2.7 osteoporosis, -2.2 osteopenia\par Proximal radius: -4.1 osteoporosis, prior -2.0\par The pt was recommended to start Tymlos or Evenity based on low proximal radius BMD.\par \par Review of outside BMD 1/2015 from [facility], images personally reviewed. \par Spine -2.1, osteopenia, stable versus 2012. \par Total hip -2.0, osteopenia, stable\par Femoral neck -1.9, osteopenia, stable\par \par Hypothyroidism. Currently on LT4 50 mcg 1/2 tablet 4 days, full tablet 3 days. No local neck pain. No dysphagia or dysphonia. No raciness, shakiness, heat/cold intolerance, tiredness, or fatigue. No palpitations, tremors, or sudden weight gain/loss.\par \par Diagnosed with CLL ~2017, asymptomatic.\par \par Pt had uterine polyps removed 9/2021.

## 2022-08-17 ENCOUNTER — OUTPATIENT (OUTPATIENT)
Dept: OUTPATIENT SERVICES | Facility: HOSPITAL | Age: 72
LOS: 1 days | Discharge: HOME | End: 2022-08-17

## 2022-08-17 DIAGNOSIS — D72.89 OTHER SPECIFIED DISORDERS OF WHITE BLOOD CELLS: ICD-10-CM

## 2022-08-19 ENCOUNTER — RESULT REVIEW (OUTPATIENT)
Age: 72
End: 2022-08-19

## 2022-08-19 ENCOUNTER — LABORATORY RESULT (OUTPATIENT)
Age: 72
End: 2022-08-19

## 2022-08-19 ENCOUNTER — APPOINTMENT (OUTPATIENT)
Dept: HEMATOLOGY ONCOLOGY | Facility: CLINIC | Age: 72
End: 2022-08-19

## 2022-08-19 VITALS
WEIGHT: 113.54 LBS | SYSTOLIC BLOOD PRESSURE: 155 MMHG | TEMPERATURE: 97.8 F | DIASTOLIC BLOOD PRESSURE: 80 MMHG | HEART RATE: 74 BPM | OXYGEN SATURATION: 99 % | RESPIRATION RATE: 16 BRPM | HEIGHT: 63.43 IN | BODY MASS INDEX: 19.87 KG/M2

## 2022-08-19 DIAGNOSIS — S00.32XA: ICD-10-CM

## 2022-08-19 LAB
BASOPHILS # BLD AUTO: 0.14 K/UL — SIGNIFICANT CHANGE UP (ref 0–0.2)
BASOPHILS NFR BLD AUTO: 1 % — SIGNIFICANT CHANGE UP (ref 0–2)
EOSINOPHIL # BLD AUTO: 0.29 K/UL — SIGNIFICANT CHANGE UP (ref 0–0.5)
EOSINOPHIL NFR BLD AUTO: 2 % — SIGNIFICANT CHANGE UP (ref 0–6)
HCT VFR BLD CALC: 40.8 % — SIGNIFICANT CHANGE UP (ref 34.5–45)
HGB BLD-MCNC: 13.2 G/DL — SIGNIFICANT CHANGE UP (ref 11.5–15.5)
LYMPHOCYTES # BLD AUTO: 51 % — HIGH (ref 13–44)
LYMPHOCYTES # BLD AUTO: 7.33 K/UL — HIGH (ref 1–3.3)
LYMPHOCYTES # SPEC AUTO: 8 % — HIGH (ref 0–0)
MCHC RBC-ENTMCNC: 29 PG — SIGNIFICANT CHANGE UP (ref 27–34)
MCHC RBC-ENTMCNC: 32.4 G/DL — SIGNIFICANT CHANGE UP (ref 32–36)
MCV RBC AUTO: 89.7 FL — SIGNIFICANT CHANGE UP (ref 80–100)
MONOCYTES # BLD AUTO: 0.86 K/UL — SIGNIFICANT CHANGE UP (ref 0–0.9)
MONOCYTES NFR BLD AUTO: 6 % — SIGNIFICANT CHANGE UP (ref 2–14)
NEUTROPHILS # BLD AUTO: 4.6 K/UL — SIGNIFICANT CHANGE UP (ref 1.8–7.4)
NEUTROPHILS NFR BLD AUTO: 32 % — LOW (ref 43–77)
NRBC # BLD: 0 /100 — SIGNIFICANT CHANGE UP (ref 0–0)
NRBC # BLD: SIGNIFICANT CHANGE UP /100 WBCS (ref 0–0)
PLAT MORPH BLD: NORMAL — SIGNIFICANT CHANGE UP
PLATELET # BLD AUTO: 219 K/UL — SIGNIFICANT CHANGE UP (ref 150–400)
RBC # BLD: 4.55 M/UL — SIGNIFICANT CHANGE UP (ref 3.8–5.2)
RBC # FLD: 12.9 % — SIGNIFICANT CHANGE UP (ref 10.3–14.5)
RBC BLD AUTO: SIGNIFICANT CHANGE UP
SMUDGE CELLS # BLD: PRESENT — SIGNIFICANT CHANGE UP
WBC # BLD: 14.38 K/UL — HIGH (ref 3.8–10.5)
WBC # FLD AUTO: 14.38 K/UL — HIGH (ref 3.8–10.5)

## 2022-08-19 PROCEDURE — 99215 OFFICE O/P EST HI 40 MIN: CPT

## 2022-08-19 RX ORDER — ACYCLOVIR 50 MG/G
5 CREAM TOPICAL
Qty: 1 | Refills: 0 | Status: ACTIVE | COMMUNITY
Start: 2022-08-19 | End: 1900-01-01

## 2022-08-22 LAB
ALBUMIN SERPL ELPH-MCNC: 4.2 G/DL
ALP BLD-CCNC: 106 U/L
ALT SERPL-CCNC: 26 U/L
ANION GAP SERPL CALC-SCNC: 12 MMOL/L
AST SERPL-CCNC: 31 U/L
BILIRUB SERPL-MCNC: 0.3 MG/DL
BUN SERPL-MCNC: 25 MG/DL
CALCIUM SERPL-MCNC: 9.9 MG/DL
CHLORIDE SERPL-SCNC: 102 MMOL/L
CO2 SERPL-SCNC: 29 MMOL/L
CREAT SERPL-MCNC: 0.91 MG/DL
EGFR: 67 ML/MIN/1.73M2
GLUCOSE SERPL-MCNC: 102 MG/DL
MANUAL DIF COMMENT BLD-IMP: SIGNIFICANT CHANGE UP
POTASSIUM SERPL-SCNC: 3.4 MMOL/L
PROT SERPL-MCNC: 6.7 G/DL
SODIUM SERPL-SCNC: 143 MMOL/L
T3RU NFR SERPL: 1 TBI
T4 SERPL-MCNC: 8.4 UG/DL

## 2022-08-22 NOTE — ASSESSMENT
[Supportive] : Goals of care discussed with patient: Supportive [Palliative Care Plan] : not applicable at this time [FreeTextEntry1] : NOE Roman is a 71 year old female with Mathis Binet Stage 0 Chronic Lymphocytic Leukemia.\par  Her white cell (lymphocyte count) has remained in the same range as previously noted with an absolute total whiter cell count increasing by about 2000 cells. Prior CBC s 1/22/21 with WBC 16.2 lymph 69.6% with a normal hemoglobin and platelet count.\par 07/20/2021 she has WBC 10.31 Hb 12.9 Plt 180. She has a normal HGB and a normal platelet count.\par she has immune reactivity to COVID 19 spike domain antibody. SPEP show M spike 0.5 post vaccination\par 01/14/2022 CBC WBC 13.8 absolute neutrophil 4.380  HGB 13  000 normal LDH (outside laboratory result)\par  \par Overall management now is observation in the setting of a white cell count less than 20 000 and normal HGB and platelet count.\par She has had two vaccinations and booster and she has been wearing masks and practices risk avoidance techniques .\par \par Continue observation as she has not shown progression of lymphocyte count over 3 years and is not anemic or thrombocytopenia. If counts were to dramatically increase may consider bone marrow biopsy to assess for proliferative gene mutations (MYD 88 etc) \par RTC 6 months\par 08/19/2022 Office visit at [facility] on return form [place] trip. Aside from symptoms of nasal blister and upper respiratory infection , she appears well and has no fever. I have prescribed acyclovir 5 % cream for nasal blister to her [place] pharmacy. Blood testing is stable. I will repeat the Flow cytometry with FISH for CLL to see if the 13 q status is similar. I would expect similarity given similar findings.\par RTC in 6 months either in person or by teleheath as she lives in [place]

## 2022-08-22 NOTE — PHYSICAL EXAM
[Fully active, able to carry on all pre-disease performance without restriction] : Status 0 - Fully active, able to carry on all pre-disease performance without restriction [Normal] : RRR, normal S1S2, no murmurs, rubs, gallops [de-identified] : blisters in nasal area ; no bleeding [de-identified] : no palpable liver or spleen [de-identified] : no palpable cervical axillary or supraclavicular lymph nodes [de-identified] : recent superficial scratches on left thigh skin due to cat

## 2022-08-22 NOTE — HISTORY OF PRESENT ILLNESS
[Disease:__________________________] : Disease: [unfilled] [Date: ____________] : Patient's last distress assessment performed on [unfilled]. [0 - No Distress] : Distress Level: 0 [100: Normal, no complaints, no evidence of disease.] : 100: Normal, no complaints, no evidence of disease. [ECOG Performance Status: 0 - Fully active, able to carry on all pre-disease performance without restriction] : Performance Status: 0 - Fully active, able to carry on all pre-disease performance without restriction [de-identified] : Corinne Rosen is a 71 year old female presenting to the office for an  evaluation of leukocytosis. She was referred by her PCP, Dr. Carmel Duran in 2018. She first noted elevated white blood cell counts for the last 4 months, ranging from 12,700 to 16,000. \par Past medical history includes history of malignant melanoma, hypothyroidism, vitamin D deficiency, vitamin B 12 deficiency, fibrocystic breast disease, osteopenia, hypercholesterolemia. \par Past surgical history includes excision of melanoma of right shoulder (approximately 1995), left breast lumpectomy - benign. 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 \par She drinks 1 glass of wine a day, and no smoking cigarettes for the last 40 years. \par  \par  [de-identified] : 0 [de-identified] : CD 19,20 expression [FreeTextEntry1] : observation [de-identified] : The patient has been feeling well over past six months . she is fully active and may ski today. No hospitalzation; wears mask and full COVID avoidance stagiest. No transfusions or illness. \par Discussion with primary care physician about bone strengthening medication (possibly bisphosphonates) . She was receiving estrogen briefly but had a non malignant uterine polyp.\par May use vitamin D\par 08/19/2022 She has been feeling wel and has returned from a trip to [place] which is currently in doug. High ambient temperatures in [place]: 33 C.\par No weight loss and no lymphadenopathy. She has had URI symptoms and recently was tested COV 2 negative by nasal swab. has had nasal blister and took two days of oral valacyclovir.Today her cat scratched her left thigh in automobile trip  5-Fu Pregnancy And Lactation Text: This medication is Pregnancy Category X and contraindicated in pregnancy and in women who may become pregnant. It is unknown if this medication is excreted in breast milk.

## 2022-08-22 NOTE — RESULTS/DATA
[FreeTextEntry1] : review of information sent to me from [place]\par 01/11/2022 CBC WBC 13.8 absolute neutrophil 4.390 lymph percentage 62.4 %.normal 59% HGB 13.0  000\par \par [facility]\par 08/19/2022 CBC WBC 14.38 HGB 13.3  000

## 2022-08-22 NOTE — REASON FOR VISIT
[Follow-Up Visit] : a follow-up visit for [Blood Count Assessment] : blood count assessment [CLL] : chronic lymphocytic leukemia [Spouse] : spouse [FreeTextEntry2] : stage 0 CLL

## 2022-08-24 LAB
ALBUMIN MFR SERPL ELPH: 57.4 %
ALBUMIN SERPL-MCNC: 3.8 G/DL
ALBUMIN/GLOB SERPL: 1.3 RATIO
ALPHA1 GLOB MFR SERPL ELPH: 6.1 %
ALPHA1 GLOB SERPL ELPH-MCNC: 0.4 G/DL
ALPHA2 GLOB MFR SERPL ELPH: 12.6 %
ALPHA2 GLOB SERPL ELPH-MCNC: 0.8 G/DL
B-GLOBULIN MFR SERPL ELPH: 10.6 %
B-GLOBULIN SERPL ELPH-MCNC: 0.7 G/DL
DEPRECATED KAPPA LC FREE/LAMBDA SER: 0.36 RATIO
GAMMA GLOB FLD ELPH-MCNC: 0.9 G/DL
GAMMA GLOB MFR SERPL ELPH: 13.3 %
IGA SER QL IEP: 68 MG/DL
IGG SER QL IEP: 949 MG/DL
IGM SER QL IEP: 52 MG/DL
INTERPRETATION SERPL IEP-IMP: NORMAL
KAPPA LC CSF-MCNC: 4.84 MG/DL
KAPPA LC SERPL-MCNC: 1.72 MG/DL
M PROTEIN MFR SERPL ELPH: 6.7 %
M PROTEIN SPEC IFE-MCNC: NORMAL
MONOCLON BAND OBS SERPL: 0.4 G/DL
PROT SERPL-MCNC: 6.7 G/DL
PROT SERPL-MCNC: 6.7 G/DL

## 2022-09-02 ENCOUNTER — NON-APPOINTMENT (OUTPATIENT)
Age: 72
End: 2022-09-02

## 2023-02-23 ENCOUNTER — RESULT REVIEW (OUTPATIENT)
Age: 73
End: 2023-02-23

## 2023-02-23 ENCOUNTER — APPOINTMENT (OUTPATIENT)
Dept: ENDOCRINOLOGY | Facility: CLINIC | Age: 73
End: 2023-02-23
Payer: MEDICARE

## 2023-02-23 ENCOUNTER — LABORATORY RESULT (OUTPATIENT)
Age: 73
End: 2023-02-23

## 2023-02-23 ENCOUNTER — OUTPATIENT (OUTPATIENT)
Dept: OUTPATIENT SERVICES | Facility: HOSPITAL | Age: 73
LOS: 1 days | Discharge: HOME | End: 2023-02-23

## 2023-02-23 ENCOUNTER — APPOINTMENT (OUTPATIENT)
Dept: HEMATOLOGY ONCOLOGY | Facility: CLINIC | Age: 73
End: 2023-02-23

## 2023-02-23 VITALS
SYSTOLIC BLOOD PRESSURE: 126 MMHG | WEIGHT: 113 LBS | RESPIRATION RATE: 14 BRPM | HEIGHT: 63 IN | OXYGEN SATURATION: 97 % | HEART RATE: 77 BPM | DIASTOLIC BLOOD PRESSURE: 84 MMHG | BODY MASS INDEX: 20.02 KG/M2

## 2023-02-23 DIAGNOSIS — D72.89 OTHER SPECIFIED DISORDERS OF WHITE BLOOD CELLS: ICD-10-CM

## 2023-02-23 LAB
BASOPHILS # BLD AUTO: 0.11 K/UL — SIGNIFICANT CHANGE UP (ref 0–0.2)
BASOPHILS NFR BLD AUTO: 0.8 % — SIGNIFICANT CHANGE UP (ref 0–2)
EOSINOPHIL # BLD AUTO: 0.16 K/UL — SIGNIFICANT CHANGE UP (ref 0–0.5)
EOSINOPHIL NFR BLD AUTO: 1.1 % — SIGNIFICANT CHANGE UP (ref 0–6)
HCT VFR BLD CALC: 41.4 % — SIGNIFICANT CHANGE UP (ref 34.5–45)
HGB BLD-MCNC: 13.4 G/DL — SIGNIFICANT CHANGE UP (ref 11.5–15.5)
IMM GRANULOCYTES NFR BLD AUTO: 0.1 % — SIGNIFICANT CHANGE UP (ref 0–0.9)
LYMPHOCYTES # BLD AUTO: 10.5 K/UL — HIGH (ref 1–3.3)
LYMPHOCYTES # BLD AUTO: 72.4 % — HIGH (ref 13–44)
MCHC RBC-ENTMCNC: 28.8 PG — SIGNIFICANT CHANGE UP (ref 27–34)
MCHC RBC-ENTMCNC: 32.4 G/DL — SIGNIFICANT CHANGE UP (ref 32–36)
MCV RBC AUTO: 88.8 FL — SIGNIFICANT CHANGE UP (ref 80–100)
MONOCYTES # BLD AUTO: 0.54 K/UL — SIGNIFICANT CHANGE UP (ref 0–0.9)
MONOCYTES NFR BLD AUTO: 3.7 % — SIGNIFICANT CHANGE UP (ref 2–14)
NEUTROPHILS # BLD AUTO: 3.18 K/UL — SIGNIFICANT CHANGE UP (ref 1.8–7.4)
NEUTROPHILS NFR BLD AUTO: 21.9 % — LOW (ref 43–77)
NRBC # BLD: 0 /100 WBCS — SIGNIFICANT CHANGE UP (ref 0–0)
PLATELET # BLD AUTO: 206 K/UL — SIGNIFICANT CHANGE UP (ref 150–400)
RBC # BLD: 4.66 M/UL — SIGNIFICANT CHANGE UP (ref 3.8–5.2)
RBC # FLD: 13.6 % — SIGNIFICANT CHANGE UP (ref 10.3–14.5)
WBC # BLD: 14.51 K/UL — HIGH (ref 3.8–10.5)
WBC # FLD AUTO: 14.51 K/UL — HIGH (ref 3.8–10.5)

## 2023-02-23 PROCEDURE — ZZZZZ: CPT

## 2023-02-23 PROCEDURE — 77080 DXA BONE DENSITY AXIAL: CPT | Mod: GA

## 2023-02-23 PROCEDURE — 99214 OFFICE O/P EST MOD 30 MIN: CPT | Mod: 25

## 2023-02-24 NOTE — ASSESSMENT
[Denosumab Therapy] : Risks  and benefits of denosumab therapy were discussed with the patient including eczema, cellulitis, osteonecrosis of the jaw and atypical femur fractures [Bisphosphonate Therapy] : Risks and benefits of bisphosphonate therapy were  discussed with the patient including gastroesophageal irritation, osteonecrosis of the jaw, and atypical femur fractures, and acute phase reaction [Bisphosphonates] : The patient was instructed to take bisphosphonates on an empty stomach with a full glass of water,and wait at least 30 minutes before eating or lying down [Levothyroxine] : The patient was instructed to take Levothyroxine on an empty stomach, separate from vitamins, and wait at least 30 minutes before eating [FreeTextEntry1] : 72 year-old female with history of low bone density and hypothyroidism\par  , pt lives in [place].\par \par Previously on Fosamax for many years. BMD 6/2018 is consistent with osteopenia and stable off Fosamax versus 2016. I recommend a continued drug holiday. Outside BMD 8/2021 decreased at all sites but indicated severely low osteoporosis in proximal radius, images not available. Repeat BMD 2/2022 in office showed worsened now osteoporosis at all sites, the proximal radius is not severely low. BMD results reviewed w/ pt. \par Patient was treated with intravenous Reclast May 2022.  Tolerated well.  No interval fractures.\par Repeat bone density stabilized or improved at all sites 2023.\par Options reviewed.  Patient was intolerant of oral therapy in the past.  Recommend repeat dose of Reclast 5 mg IV approximately May or June 2023.  Repeat bone density 1 year spring 2024.\par \par Hypothyroidism. Currently on LT4 50 mcg 1/2 tablet 4 days, full tablet 3 days. No local neck pain. No dysphagia or dysphonia. No raciness, shakiness, heat/cold intolerance, tiredness, or fatigue. No palpitations, tremors, or sudden weight gain/loss.\par \par Pt will send me more up-to-date labs.\par \par F/u in 1 year w/ BMD\par \par \par Okay\par \par Pharmacological Management of Osteoporosis in Postmenopausal Women: An Endocrine Society  Clinical Practice Guideline. Sloane R, Abel PLEITEZ., Nixon DM, Robert AM, Suzette MH, Suzette D. JCEM 2019 104: [id number]

## 2023-02-24 NOTE — CONSULT LETTER
[Dear  ___] : Dear  [unfilled], [Consult Letter:] : I had the pleasure of evaluating your patient, [unfilled]. [Please see my note below.] : Please see my note below. [Consult Closing:] : Thank you very much for allowing me to participate in the care of this patient.  If you have any questions, please do not hesitate to contact me. [FreeTextEntry2] : Preston Young MD\par [address], \[place] [address]\par [phone]

## 2023-02-24 NOTE — PROCEDURE
[FreeTextEntry1] : \par Bone mineral density: 02/23/2023\par indication: Compared to 2020 to assess response to medication\par spine -2.2 osteopenia +3.5%\par total hip -2.5 osteoporosis no significant change\par femoral neck -2.5 osteoporosis no significant change\par proximal radius -2.9 osteoporosis no significant change\par \par Bone mineral density: 02/17/2022 \par Indication: vs. 2018\par Spine: -2.5 osteoporosis, -4.4%\par Total hip: -2.6 osteoporosis, -3.7%\par Femoral neck: -2.5 osteoporosis, -6.7%\par R Proximal radius: -2.7 osteoporosis, -8.1%\par \par Bone mineral density: 06/25/2018 \par Indication: vs. 6/2016\par Spine: -2.1 osteopenia, no significant change \par Total hip: -2.4 osteopenia, no significant change  \par Femoral neck: -2.2 osteopenia, no significant change \par Proximal radius: - 20 osteopenia, no significant change \par \par Bone mineral density June 7, 2016\par Spine -2.0, osteopenia\par Total hip - 2.3, osteopenia\par Femoral neck -2.2, osteopenia\par Proximal radius -1.8, osteopenia

## 2023-02-24 NOTE — HISTORY OF PRESENT ILLNESS
[Risedronate (Actonel)] : Risedronate [FreeTextEntry1] :  , pt lives in [place].\par No significant interval health changes. No interval hospitalizations, fractures, or change in medications.\par Patient received Reclast 5 mg IV May 2022 in [place].  Tolerated well.  Noted acute phase reaction.  No interval fractures.  Up-to-date with dentist.\par \par \par hx\par On Actonel for 12 years, stopped ~2014. Was still on a drug holiday. Had toe fracture 11/2014, no other fractures. Previous rib fx. H/o anorexia at age 19 for ~1 year. Fh/o osteoporosis in mother, hip fx. Pt takes Ca 500 mg daily.\par  Pt told of rapid decrease in BMD. \par Bone mineral density: 8/2021, images not available\par Spine: -2.4 osteopenia, prior -2.1\par Total hip: -2.7 osteoporosis, -2.4 osteopenia\par Femoral neck: -2.7 osteoporosis, -2.2 osteopenia\par Proximal radius: -4.1 osteoporosis, prior -2.0\par The pt was recommended to start Tymlos or Evenity based on low proximal radius BMD.\par \par Review of outside BMD 1/2015 from [facility], images personally reviewed. \par Spine -2.1, osteopenia, stable versus 2012. \par Total hip -2.0, osteopenia, stable\par Femoral neck -1.9, osteopenia, stable\par \par Hypothyroidism. Currently on LT4 50 mcg 1/2 tablet 4 days, full tablet 3 days. No local neck pain. No dysphagia or dysphonia. No raciness, shakiness, heat/cold intolerance, tiredness, or fatigue. No palpitations, tremors, or sudden weight gain/loss.\par \par Diagnosed with CLL ~2017, asymptomatic.\par \par Pt had uterine polyps removed 9/2021.

## 2023-03-03 ENCOUNTER — APPOINTMENT (OUTPATIENT)
Dept: HEMATOLOGY ONCOLOGY | Facility: CLINIC | Age: 73
End: 2023-03-03

## 2023-03-03 ENCOUNTER — APPOINTMENT (OUTPATIENT)
Dept: HEMATOLOGY ONCOLOGY | Facility: CLINIC | Age: 73
End: 2023-03-03
Payer: MEDICARE

## 2023-03-03 PROCEDURE — 99215 OFFICE O/P EST HI 40 MIN: CPT | Mod: 95

## 2023-03-03 NOTE — RESULTS/DATA
[FreeTextEntry1] : review of information sent to me from [place]\par 01/11/2022 CBC WBC 13.8 absolute neutrophil 4.390 lymph percentage 62.4 %.normal 59% HGB 13.0  000\par \par [facility]\par 08/19/2022 CBC WBC 14.38 HGB 13.3  000\par 02/23/2023 CBC WBC 14.8   HGB 13.4  000 absolute neutrophil 3.18 lymph 10.5  IgG 991 IgM 76 IgA 58

## 2023-03-03 NOTE — PHYSICAL EXAM
[Fully active, able to carry on all pre-disease performance without restriction] : Status 0 - Fully active, able to carry on all pre-disease performance without restriction [Normal] : affect appropriate [de-identified] : as seen [de-identified] : blisters in nasal area ; no bleeding [de-identified] : no palpable liver or spleen [de-identified] : no palpable cervical axillary or supraclavicular lymph nodes [de-identified] : recent superficial scratches on left thigh skin due to cat

## 2023-03-03 NOTE — REVIEW OF SYSTEMS
[Negative] : Allergic/Immunologic [Chest Pain] : chest pain [FreeTextEntry5] : recent episode of chest pain negative evaluation of acute ischemia

## 2023-03-03 NOTE — HISTORY OF PRESENT ILLNESS
[Verbal consent obtained from patient] : the patient, [unfilled] [Disease:__________________________] : Disease: [unfilled] [0 - No Distress] : Distress Level: 0 [100: Normal, no complaints, no evidence of disease.] : 100: Normal, no complaints, no evidence of disease. [ECOG Performance Status: 0 - Fully active, able to carry on all pre-disease performance without restriction] : Performance Status: 0 - Fully active, able to carry on all pre-disease performance without restriction [Medical Office: ([facility])___] : at the medical office located in  [de-identified] : Corinne Rosen is a 72 year old female presenting to the office for an  evaluation of leukocytosis.\par  She was referred by her PCP, Dr. Carmel Duran in 2018, at age 67. She first noted elevated white blood cell counts for the last 4 months, ranging from 12,700 to 16,000. \par Past medical history includes history of malignant melanoma, hypothyroidism, vitamin D deficiency, vitamin B 12 deficiency, fibrocystic breast disease, osteopenia, hypercholesterolemia. \par Past surgical history includes excision of melanoma of right shoulder (approximately 1995), left breast lumpectomy - benign. 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 \par She drinks 1 glass of wine a day, and no smoking cigarettes for the last 40 years. \par  \par  [de-identified] : 0 [de-identified] : CD 19,20 expression [FreeTextEntry1] : observation [de-identified] : The patient has been feeling well over past six months . she is fully active and may ski today. No hospitalzation; wears mask and full COVID avoidance stagiest. No transfusions or illness. \par Discussion with primary care physician about bone strengthening medication (possibly bisphosphonates) . She was receiving estrogen briefly but had a non malignant uterine polyp.\par May use vitamin D\par 08/19/2022 She has been feeling well and has returned from a trip to [place] which is currently in [place]. High ambient temperatures in [place]: 33 C.\par No weight loss and no lymphadenopathy. She has had URI symptoms and recently was tested COV 2 negative by nasal swab. has had nasal blister and took two days of oral valacyclovir.Today her cat scratched her left thigh in automobile trip \par 03/03/2023 6 month follow up. She had chest pain in AM approximately 02/10/2023 and was evaluated at hospital in [place]. Normal ECG normal troponin and a normal cardiac CT. Symptoms had resolved. White blood cell count had not greatly increased. No chest pains at present . Exercising and good rest. No fever no chills. She is planning a trip abroad and had a last COV 2 vaccination in September 2022. She may proceed with a booster at six month interval. No episodes of symptoms suggestive of COVID infection [Date: ____________] : Patient's last distress assessment performed on [unfilled].

## 2023-03-08 ENCOUNTER — NON-APPOINTMENT (OUTPATIENT)
Age: 73
End: 2023-03-08

## 2023-08-24 ENCOUNTER — OUTPATIENT (OUTPATIENT)
Dept: OUTPATIENT SERVICES | Facility: HOSPITAL | Age: 73
LOS: 1 days | Discharge: HOME | End: 2023-08-24

## 2023-08-24 DIAGNOSIS — D72.89 OTHER SPECIFIED DISORDERS OF WHITE BLOOD CELLS: ICD-10-CM

## 2023-09-01 ENCOUNTER — RESULT REVIEW (OUTPATIENT)
Age: 73
End: 2023-09-01

## 2023-09-01 ENCOUNTER — APPOINTMENT (OUTPATIENT)
Dept: HEMATOLOGY ONCOLOGY | Facility: CLINIC | Age: 73
End: 2023-09-01
Payer: MEDICARE

## 2023-09-01 VITALS
WEIGHT: 112.41 LBS | HEIGHT: 63 IN | SYSTOLIC BLOOD PRESSURE: 153 MMHG | RESPIRATION RATE: 16 BRPM | DIASTOLIC BLOOD PRESSURE: 75 MMHG | HEART RATE: 73 BPM | TEMPERATURE: 97.6 F | OXYGEN SATURATION: 98 % | BODY MASS INDEX: 19.92 KG/M2

## 2023-09-01 LAB
BASOPHILS # BLD AUTO: 0 K/UL — SIGNIFICANT CHANGE UP (ref 0–0.2)
BASOPHILS NFR BLD AUTO: 0 % — SIGNIFICANT CHANGE UP (ref 0–2)
EOSINOPHIL # BLD AUTO: 0.08 K/UL — SIGNIFICANT CHANGE UP (ref 0–0.5)
EOSINOPHIL NFR BLD AUTO: 0.5 % — SIGNIFICANT CHANGE UP (ref 0–6)
HCT VFR BLD CALC: 43.1 % — SIGNIFICANT CHANGE UP (ref 34.5–45)
HGB BLD-MCNC: 13.9 G/DL — SIGNIFICANT CHANGE UP (ref 11.5–15.5)
LYMPHOCYTES # BLD AUTO: 11.83 K/UL — HIGH (ref 1–3.3)
LYMPHOCYTES # BLD AUTO: 71.5 % — HIGH (ref 13–44)
LYMPHOCYTES # SPEC AUTO: 7 % — HIGH (ref 0–0)
MCHC RBC-ENTMCNC: 28.1 PG — SIGNIFICANT CHANGE UP (ref 27–34)
MCHC RBC-ENTMCNC: 32.3 G/DL — SIGNIFICANT CHANGE UP (ref 32–36)
MCV RBC AUTO: 87.1 FL — SIGNIFICANT CHANGE UP (ref 80–100)
MONOCYTES # BLD AUTO: 0.33 K/UL — SIGNIFICANT CHANGE UP (ref 0–0.9)
MONOCYTES NFR BLD AUTO: 2 % — SIGNIFICANT CHANGE UP (ref 2–14)
NEUTROPHILS # BLD AUTO: 3.14 K/UL — SIGNIFICANT CHANGE UP (ref 1.8–7.4)
NEUTROPHILS NFR BLD AUTO: 19 % — LOW (ref 43–77)
NRBC # BLD: 0 /100 — SIGNIFICANT CHANGE UP (ref 0–0)
NRBC # BLD: SIGNIFICANT CHANGE UP /100 WBCS (ref 0–0)
PLAT MORPH BLD: NORMAL — SIGNIFICANT CHANGE UP
PLATELET # BLD AUTO: 195 K/UL — SIGNIFICANT CHANGE UP (ref 150–400)
RBC # BLD: 4.95 M/UL — SIGNIFICANT CHANGE UP (ref 3.8–5.2)
RBC # FLD: 13.6 % — SIGNIFICANT CHANGE UP (ref 10.3–14.5)
RBC BLD AUTO: SIGNIFICANT CHANGE UP
SMUDGE CELLS # BLD: PRESENT — SIGNIFICANT CHANGE UP
WBC # BLD: 16.55 K/UL — HIGH (ref 3.8–10.5)
WBC # FLD AUTO: 16.55 K/UL — HIGH (ref 3.8–10.5)

## 2023-09-01 PROCEDURE — 99214 OFFICE O/P EST MOD 30 MIN: CPT

## 2023-09-01 NOTE — ASSESSMENT
[Supportive] : Goals of care discussed with patient: Supportive [Palliative Care Plan] : not applicable at this time [FreeTextEntry1] : NOE Roman is a 71 year old female with Mathis Binet Stage 0 Chronic Lymphocytic Leukemia.  Her white cell (lymphocyte count) has remained in the same range as previously noted with an absolute total whiter cell count increasing by about 2000 cells. Prior CBC s 1/22/21 with WBC 16.2 lymph 69.6% with a normal hemoglobin and platelet count. 07/20/2021 she has WBC 10.31 Hb 12.9 Plt 180. She has a normal HGB and a normal platelet count. she has immune reactivity to COVID 19 spike domain antibody. SPEP show M spike 0.5 post vaccination 01/14/2022 CBC WBC 13.8 absolute neutrophil 4.380  HGB 13  000 normal LDH (outside laboratory result)   Overall management now is observation in the setting of a white cell count less than 20 000 and normal HGB and platelet count. She has had two vaccinations and booster and she has been wearing masks and practices risk avoidance techniques .  Continue observation as she has not shown progression of lymphocyte count over 3 years and is not anemic or thrombocytopenia. If counts were to dramatically increase may consider bone marrow biopsy to assess for proliferative gene mutations (MYD 88 etc)  RTC 6 months 08/19/2022 Office visit at [facility] on return form [place] trip. Aside from symptoms of nasal blister and upper respiratory infection , she appears well and has no fever. I have prescribed acyclovir 5 % cream for nasal blister to her [place] pharmacy. Blood testing is stable. I will repeat the Flow cytometry with FISH for CLL to see if the 13 q status is similar. I would expect similarity given similar findings. RTC in 6 months either in person or by tele ceyl as she lives in [place] 03/03/2023 She had an episode of chest pain and brief hospitalization; The evaluation for cardiac disease was negative. Her CBC shows no significant change in the past 6 months; she does not have neutropenia; and she has not had hospital admission for viral or bacterial disease. Immune globulin levels are normal and no signs of immune deficiency. She is recommended to have booster for COV 2 prophylaxis. She will continue to be observed off therapy in treatment of stage 1 CLL. 09/01/2023 She has returned from a trip cruise to [facility], and she had no medical problems. Although she has known a person with a URI she has no fever and has felt as though she was developing symptoms of a "scratchy throat" she has a normal physical examination. She thinks that URI symptoms may account for a slight rise in WBC to 16 000 although it is possible that this reflects her CLL activity in which WBC count has risen since 2017 form 11 000. We discussed natural history of CLL and her last immune globulin levels with normal IgG level and slight decrease in Iga. I would not recommend gamma globulin therapy or lymphocyte lowering treatment, on basis of her current findings. Immune globulin panel requested today results are pending. RTC in 6 months with a telehealth visit from [place]

## 2023-09-01 NOTE — PHYSICAL EXAM
[Fully active, able to carry on all pre-disease performance without restriction] : Status 0 - Fully active, able to carry on all pre-disease performance without restriction [Normal] : affect appropriate [de-identified] : as seen [de-identified] : no palpable liver or spleen [de-identified] : blisters in nasal area ; no bleeding [de-identified] : no palpable cervical axillary or supraclavicular lymph nodes [de-identified] : recent superficial scratches on left thigh skin due to cat

## 2023-09-01 NOTE — HISTORY OF PRESENT ILLNESS
[Disease:__________________________] : Disease: [unfilled] [0 - No Distress] : Distress Level: 0 [100: Normal, no complaints, no evidence of disease.] : 100: Normal, no complaints, no evidence of disease. [ECOG Performance Status: 0 - Fully active, able to carry on all pre-disease performance without restriction] : Performance Status: 0 - Fully active, able to carry on all pre-disease performance without restriction [Medical Office: ([facility])___] : at the medical office located in  [Verbal consent obtained from patient] : the patient, [unfilled] [Date: ____________] : Patient's last distress assessment performed on [unfilled]. [de-identified] : Corinne Rosen is a 72 year old female presenting to the office for an  evaluation of leukocytosis.\par   She was referred by her PCP, Dr. Carmel Duran in 2018, at age 67. She first noted elevated white blood cell counts for the last 4 months, ranging from 12,700 to 16,000. \par  Past medical history includes history of malignant melanoma, hypothyroidism, vitamin D deficiency, vitamin B 12 deficiency, fibrocystic breast disease, osteopenia, hypercholesterolemia. \par  Past surgical history includes excision of melanoma of right shoulder (approximately 1995), left breast lumpectomy - benign. \par  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 \par  She drinks 1 glass of wine a day, and no smoking cigarettes for the last 40 years. \par   \par   [de-identified] : 0 [de-identified] : CD 19,20 expression [de-identified] : FISH del 13 favorable prognosis [FreeTextEntry1] : observation [de-identified] : The patient has been feeling well over past six months . she is fully active and may ski today. No hospitalzation; wears mask and full COVID avoidance stagiest. No transfusions or illness.  Discussion with primary care physician about bone strengthening medication (possibly bisphosphonates) . She was receiving estrogen briefly but had a non malignant uterine polyp. May use vitamin D 08/19/2022 She has been feeling well and has returned from a trip to [place] which is currently in drought. High ambient temperatures in [place]: 33 C. No weight loss and no lymphadenopathy. She has had URI symptoms and recently was tested COV 2 negative by nasal swab. has had nasal blister and took two days of oral valacyclovir.Today her cat scratched her left thigh in automobile trip  03/03/2023 6 month follow up. She had chest pain in AM approximately 02/10/2023 and was evaluated at hospital in [place]. Normal ECG normal troponin and a normal cardiac CT. Symptoms had resolved. White blood cell count had not greatly increased. No chest pains at present . Exercising and good rest. No fever no chills. She is planning a trip abroad and had a last COV 2 vaccination in September 2022. She may proceed with a booster at six month interval. No episodes of symptoms suggestive of COVID infection

## 2023-09-01 NOTE — RESULTS/DATA
[FreeTextEntry1] : review of information sent to me from [place] 01/11/2022 CBC WBC 13.8 absolute neutrophil 4.390 lymph percentage 62.4 %. normal 59% HGB 13.0  000  [facility] 08/19/2022 CBC WBC 14.38 HGB 13.3  000 02/23/2023 CBC WBC 14.8   HGB 13.4  000 absolute neutrophil 3.18 lymph 10.5  IgG 991 IgM 76 IgA 58  09/01/2023 CBC WBC 16.5   HGB 13.9 MCV 87  000

## 2023-09-01 NOTE — REVIEW OF SYSTEMS
[Chest Pain] : chest pain [Negative] : Allergic/Immunologic [FreeTextEntry5] : recent episode of chest pain negative evaluation of acute ischemia

## 2023-09-04 LAB
ALBUMIN MFR SERPL ELPH: 61 %
ALBUMIN SERPL-MCNC: 4.3 G/DL
ALBUMIN/GLOB SERPL: 1.6 RATIO
ALPHA1 GLOB MFR SERPL ELPH: 4.6 %
ALPHA1 GLOB SERPL ELPH-MCNC: 0.3 G/DL
ALPHA2 GLOB MFR SERPL ELPH: 10.8 %
ALPHA2 GLOB SERPL ELPH-MCNC: 0.8 G/DL
B-GLOBULIN MFR SERPL ELPH: 9.9 %
B-GLOBULIN SERPL ELPH-MCNC: 0.7 G/DL
DEPRECATED KAPPA LC FREE/LAMBDA SER: 0.53 RATIO
GAMMA GLOB FLD ELPH-MCNC: 1 G/DL
GAMMA GLOB MFR SERPL ELPH: 13.7 %
IGA SER QL IEP: 90 MG/DL
IGG SER QL IEP: 981 MG/DL
IGM SER QL IEP: 64 MG/DL
INTERPRETATION SERPL IEP-IMP: NORMAL
KAPPA LC CSF-MCNC: 2.96 MG/DL
KAPPA LC SERPL-MCNC: 1.57 MG/DL
M PROTEIN MFR SERPL ELPH: 6.9 %
M PROTEIN SPEC IFE-MCNC: NORMAL
MONOCLON BAND OBS SERPL: 0.5 G/DL
PROT SERPL-MCNC: 7 G/DL
PROT SERPL-MCNC: 7 G/DL

## 2023-12-12 ENCOUNTER — NON-APPOINTMENT (OUTPATIENT)
Age: 73
End: 2023-12-12

## 2024-02-07 DIAGNOSIS — D72.829 ELEVATED WHITE BLOOD CELL COUNT, UNSPECIFIED: ICD-10-CM

## 2024-02-07 DIAGNOSIS — D72.820 LYMPHOCYTOSIS (SYMPTOMATIC): ICD-10-CM

## 2024-04-17 ENCOUNTER — OUTPATIENT (OUTPATIENT)
Dept: OUTPATIENT SERVICES | Facility: HOSPITAL | Age: 74
LOS: 1 days | Discharge: HOME | End: 2024-04-17

## 2024-04-17 DIAGNOSIS — D72.89 OTHER SPECIFIED DISORDERS OF WHITE BLOOD CELLS: ICD-10-CM

## 2024-04-22 PROBLEM — C91.10 CHRONIC LYMPHOCYTIC LEUKEMIA, RAI STAGE 0: Status: ACTIVE | Noted: 2017-05-11

## 2024-04-23 ENCOUNTER — APPOINTMENT (OUTPATIENT)
Dept: HEMATOLOGY ONCOLOGY | Facility: CLINIC | Age: 74
End: 2024-04-23
Payer: MEDICARE

## 2024-04-23 DIAGNOSIS — C91.10 CHRONIC LYMPHOCYTIC LEUKEMIA OF B-CELL TYPE NOT HAVING ACHIEVED REMISSION: ICD-10-CM

## 2024-04-23 DIAGNOSIS — Z86.16 PERSONAL HISTORY OF COVID-19: ICD-10-CM

## 2024-04-23 PROCEDURE — 99443: CPT | Mod: 93

## 2024-04-30 NOTE — RESULTS/DATA
[FreeTextEntry1] : review of information sent to me from [place] 01/11/2022 CBC WBC 13.8 absolute neutrophil 4.390 lymph percentage 62.4 %. normal 59% HGB 13.0  000 [facility] 08/19/2022 CBC WBC 14.38 HGB 13.3  000 02/23/2023 CBC WBC 14.8   HGB 13.4  000 absolute neutrophil 3.18 lymph 10.5 IgG 991 IgM 76 IgA 58  09/01/2023 CBC WBC 16.5   HGB 13.9 MCV 87  000 04/18/2024 CBC WBC 16.9 HGB 14.2g/dL MCV 89.4  000 lymphocyte percentage 71.3% neutrophil percentage 22.8% (ANC 3.85)  BUN 19 creatine 0.8 CA corrected 10.2 albumin 4.6g/dL SGOT 40 SGPT27 alkaline phosphatase 91 bilirubin 1.0. copy of the report was mailed to the patient at [address] [facility] [id number]

## 2024-04-30 NOTE — ASSESSMENT
[Supportive] : Goals of care discussed with patient: Supportive [Palliative Care Plan] : not applicable at this time [FreeTextEntry1] : NOE Roman is a 71 year old female with Mathis Binet Stage 0 Chronic Lymphocytic Leukemia.  Her white cell (lymphocyte count) has remained in the same range as previously noted with an absolute total whiter cell count increasing by about 2000 cells. Prior CBC s 1/22/21 with WBC 16.2 lymph 69.6% with a normal hemoglobin and platelet count. 07/20/2021 she has WBC 10.31 Hb 12.9 Plt 180. She has a normal HGB and a normal platelet count. she has immune reactivity to COVID 19 spike domain antibody. SPEP show M spike 0.5 post vaccination 01/14/2022 CBC WBC 13.8 absolute neutrophil 4.380  HGB 13  000 normal LDH (outside laboratory result)   Overall management now is observation in the setting of a white cell count less than 20 000 and normal HGB and platelet count. She has had two vaccinations and booster and she has been wearing masks and practices risk avoidance techniques .  Continue observation as she has not shown progression of lymphocyte count over 3 years and is not anemic or thrombocytopenia. If counts were to dramatically increase may consider bone marrow biopsy to assess for proliferative gene mutations (MYD 88 etc)  RTC 6 months 08/19/2022 Office visit at [facility] on return form [place] trip. Aside from symptoms of nasal blister and upper respiratory infection , she appears well and has no fever. I have prescribed acyclovir 5 % cream for nasal blister to her [place] pharmacy. Blood testing is stable. I will repeat the Flow cytometry with FISH for CLL to see if the 13 q status is similar. I would expect similarity given similar findings. RTC in 6 months either in person or by tele cely as she lives in [place] 03/03/2023 She had an episode of chest pain and brief hospitalization; The evaluation for cardiac disease was negative. Her CBC shows no significant change in the past 6 months; she does not have neutropenia; and she has not had hospital admission for viral or bacterial disease. Immune globulin levels are normal and no signs of immune deficiency. She is recommended to have booster for COV 2 prophylaxis. She will continue to be observed off therapy in treatment of stage 1 CLL. 09/01/2023 She has returned from a trip cruise to [facility], and she had no medical problems. Although she has known a person with a URI she has no fever and has felt as though she was developing symptoms of a "scratchy throat" she has a normal physical examination. She thinks that URI symptoms may account for a slight rise in WBC to 16 000 although it is possible that this reflects her CLL activity in which WBC count has risen since 2017 form 11 000. We discussed natural history of CLL and her last immune globulin levels with normal IgG level and slight decrease in Iga. I would not recommend gamma globulin therapy or lymphocyte lowering treatment, on basis of her current findings. Immune globulin panel requested today results are pending. RTC in 6 months with a telehealth visit from [place]. 04/23/2024 I called the patient as a TTM today as the [employer] platform was not permitting video transmission of my image. I was able to see the patient, but she was not able to see me or to hear computer audio; I used a secure telephone link to her house by telephone call. She has given consent for telephonic visit. Her laboratory studies were not available. She called her primary care physician and report was faxed to me at 110 6 AM. I reported the results of the CBC and CMP; there is little change in absolute white cell count since September 2023. The patient does not have neutropenia and she is not requiring antibiotics or immune supplementation at this time. Normal HGB and platelet count. She remains stable with stage 1 CLL (Mathis bBnet staging). Recommend [id number] up in 6 months

## 2024-04-30 NOTE — HISTORY OF PRESENT ILLNESS
[Disease:__________________________] : Disease: [unfilled] [Date: ____________] : Patient's last distress assessment performed on [unfilled]. [0 - No Distress] : Distress Level: 0 [100: Normal, no complaints, no evidence of disease.] : 100: Normal, no complaints, no evidence of disease. [ECOG Performance Status: 0 - Fully active, able to carry on all pre-disease performance without restriction] : Performance Status: 0 - Fully active, able to carry on all pre-disease performance without restriction [de-identified] : Corinne Rosen is a 73-year-old female presenting to the office for an  evaluation of leukocytosis.  She was referred by her PCP, Dr. Carmel Duran in 2018, at age 67. She first noted elevated white blood cell counts for the last 4 months, ranging from 12,700 to 16,000.  Past medical history includes history of malignant melanoma, hypothyroidism, vitamin D deficiency, vitamin B 12 deficiency, fibrocystic breast disease, osteopenia, hypercholesterolemia.  Past surgical history includes excision of melanoma of right shoulder (approximately 1995), left breast lumpectomy - benign.  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  She drinks 1 glass of wine a day, and no smoking cigarettes for the last 40 years.     [de-identified] : 0 [de-identified] : CD 19,20 expression [de-identified] : FISH del 13 favorable prognosis [FreeTextEntry1] : observation [de-identified] : The patient has been feeling well over past six months. she is fully active and may ski today. No hospitalization; wears mask and full COVID avoidance stagiest. No transfu 03/03/2023 6 month follow up. She had chest pain in AM approximately 02/10/2023 and was evaluated at hospital in [place]. Normal ECG normal troponin and a normal cardiac CT. Symptoms had resolved. White blood cell count had not greatly increased. No chest pains at present. Exercising and good rest. No fever no chills. She is planning a trip abroad and had a last COV 2 vaccination in September 2022. She may proceed with a booster at six-month interval. No episodes of symptoms suggestive of COVID infection 04/23/2024 She had COVID 19 post September 2023 visit and took oral antiretroviral medication as prescribed by primary care at home. No hospitalzaiton. Shehas recovered and has normal activity through the remainer of the six months. No development of lymph node enlargement.

## 2024-04-30 NOTE — PHYSICAL EXAM
[Fully active, able to carry on all pre-disease performance without restriction] : Status 0 - Fully active, able to carry on all pre-disease performance without restriction [Normal] : affect appropriate [de-identified] : as seen [de-identified] : blisters in nasal area ; no bleeding [de-identified] : no palpable liver or spleen [de-identified] : no palpable cervical axillary or supraclavicular lymph nodes [de-identified] : recent superficial scratches on left thigh skin due to cat

## 2024-06-24 ENCOUNTER — APPOINTMENT (OUTPATIENT)
Dept: ENDOCRINOLOGY | Facility: CLINIC | Age: 74
End: 2024-06-24
Payer: MEDICARE

## 2024-06-24 VITALS
DIASTOLIC BLOOD PRESSURE: 74 MMHG | BODY MASS INDEX: 20.02 KG/M2 | HEIGHT: 63 IN | SYSTOLIC BLOOD PRESSURE: 130 MMHG | HEART RATE: 69 BPM | WEIGHT: 113 LBS | OXYGEN SATURATION: 97 %

## 2024-06-24 DIAGNOSIS — E03.9 HYPOTHYROIDISM, UNSPECIFIED: ICD-10-CM

## 2024-06-24 DIAGNOSIS — M81.0 AGE-RELATED OSTEOPOROSIS W/OUT CURRENT PATHOLOGICAL FRACTURE: ICD-10-CM

## 2024-06-24 PROCEDURE — ZZZZZ: CPT

## 2024-06-24 PROCEDURE — 99214 OFFICE O/P EST MOD 30 MIN: CPT

## 2024-06-24 PROCEDURE — G2211 COMPLEX E/M VISIT ADD ON: CPT

## 2024-06-24 PROCEDURE — 77080 DXA BONE DENSITY AXIAL: CPT | Mod: GA

## 2024-06-24 RX ORDER — RISEDRONATE SODIUM 150 MG/1
150 TABLET, FILM COATED ORAL
Qty: 3 | Refills: 3 | Status: DISCONTINUED | COMMUNITY
Start: 2022-02-17 | End: 2024-06-24

## 2024-06-24 RX ORDER — ZOLEDRONIC ACID 5 MG/100ML
5 INJECTION, SOLUTION INTRAVENOUS
Refills: 0 | Status: ACTIVE | COMMUNITY

## 2024-06-25 PROBLEM — M81.0 OSTEOPOROSIS, POSTMENOPAUSAL: Status: ACTIVE | Noted: 2022-02-17

## 2024-06-25 RX ORDER — CHLORTHALIDONE 25 MG/1
25 TABLET ORAL
Qty: 90 | Refills: 0 | Status: ACTIVE | COMMUNITY
Start: 2024-03-11

## 2024-09-30 DIAGNOSIS — C91.10 CHRONIC LYMPHOCYTIC LEUKEMIA OF B-CELL TYPE NOT HAVING ACHIEVED REMISSION: ICD-10-CM

## 2024-11-06 ENCOUNTER — OUTPATIENT (OUTPATIENT)
Dept: OUTPATIENT SERVICES | Facility: HOSPITAL | Age: 74
LOS: 1 days | Discharge: HOME | End: 2024-11-06

## 2024-11-06 DIAGNOSIS — D72.89 OTHER SPECIFIED DISORDERS OF WHITE BLOOD CELLS: ICD-10-CM

## 2024-11-11 ENCOUNTER — APPOINTMENT (OUTPATIENT)
Dept: HEMATOLOGY ONCOLOGY | Facility: CLINIC | Age: 74
End: 2024-11-11
Payer: MEDICARE

## 2024-11-11 DIAGNOSIS — D72.829 ELEVATED WHITE BLOOD CELL COUNT, UNSPECIFIED: ICD-10-CM

## 2024-11-11 DIAGNOSIS — M81.0 AGE-RELATED OSTEOPOROSIS W/OUT CURRENT PATHOLOGICAL FRACTURE: ICD-10-CM

## 2024-11-11 DIAGNOSIS — C91.10 CHRONIC LYMPHOCYTIC LEUKEMIA OF B-CELL TYPE NOT HAVING ACHIEVED REMISSION: ICD-10-CM

## 2024-11-11 DIAGNOSIS — D72.820 LYMPHOCYTOSIS (SYMPTOMATIC): ICD-10-CM

## 2024-11-11 PROCEDURE — 99214 OFFICE O/P EST MOD 30 MIN: CPT

## 2025-05-24 ENCOUNTER — OUTPATIENT (OUTPATIENT)
Dept: OUTPATIENT SERVICES | Facility: HOSPITAL | Age: 75
LOS: 1 days | Discharge: HOME | End: 2025-05-24

## 2025-05-24 DIAGNOSIS — D72.89 OTHER SPECIFIED DISORDERS OF WHITE BLOOD CELLS: ICD-10-CM

## 2025-05-28 ENCOUNTER — NON-APPOINTMENT (OUTPATIENT)
Age: 75
End: 2025-05-28

## 2025-05-29 ENCOUNTER — APPOINTMENT (OUTPATIENT)
Dept: HEMATOLOGY ONCOLOGY | Facility: CLINIC | Age: 75
End: 2025-05-29

## 2025-05-29 ENCOUNTER — RESULT REVIEW (OUTPATIENT)
Age: 75
End: 2025-05-29

## 2025-05-29 VITALS
OXYGEN SATURATION: 98 % | TEMPERATURE: 96.3 F | BODY MASS INDEX: 20.02 KG/M2 | HEART RATE: 98 BPM | WEIGHT: 113 LBS | RESPIRATION RATE: 18 BRPM | SYSTOLIC BLOOD PRESSURE: 113 MMHG | HEIGHT: 63 IN | DIASTOLIC BLOOD PRESSURE: 71 MMHG

## 2025-05-29 LAB
BASOPHILS # BLD AUTO: 0 K/UL — SIGNIFICANT CHANGE UP (ref 0–0.2)
BASOPHILS NFR BLD AUTO: 0 % — SIGNIFICANT CHANGE UP (ref 0–2)
EOSINOPHIL # BLD AUTO: 0.23 K/UL — SIGNIFICANT CHANGE UP (ref 0–0.5)
EOSINOPHIL NFR BLD AUTO: 1.5 % — SIGNIFICANT CHANGE UP (ref 0–6)
HCT VFR BLD CALC: 39.1 % — SIGNIFICANT CHANGE UP (ref 34.5–45)
HGB BLD-MCNC: 12.6 G/DL — SIGNIFICANT CHANGE UP (ref 11.5–15.5)
LYMPHOCYTES # BLD AUTO: 11.85 K/UL — HIGH (ref 1–3.3)
LYMPHOCYTES # BLD AUTO: 78 % — HIGH (ref 13–44)
LYMPHOCYTES # SPEC AUTO: 1.5 % — HIGH (ref 0–0)
MCHC RBC-ENTMCNC: 28.2 PG — SIGNIFICANT CHANGE UP (ref 27–34)
MCHC RBC-ENTMCNC: 32.2 G/DL — SIGNIFICANT CHANGE UP (ref 32–36)
MCV RBC AUTO: 87.5 FL — SIGNIFICANT CHANGE UP (ref 80–100)
MONOCYTES # BLD AUTO: 0.23 K/UL — SIGNIFICANT CHANGE UP (ref 0–0.9)
MONOCYTES NFR BLD AUTO: 1.5 % — LOW (ref 2–14)
NEUTROPHILS # BLD AUTO: 2.66 K/UL — SIGNIFICANT CHANGE UP (ref 1.8–7.4)
NEUTROPHILS NFR BLD AUTO: 17.5 % — LOW (ref 43–77)
NRBC # BLD: 0 /100 WBCS — SIGNIFICANT CHANGE UP (ref 0–0)
NRBC BLD AUTO-RTO: SIGNIFICANT CHANGE UP /100 WBCS (ref 0–0)
NRBC BLD-RTO: 0 /100 WBCS — SIGNIFICANT CHANGE UP (ref 0–0)
PLAT MORPH BLD: NORMAL — SIGNIFICANT CHANGE UP
PLATELET # BLD AUTO: 104 K/UL — LOW (ref 150–400)
RBC # BLD: 4.47 M/UL — SIGNIFICANT CHANGE UP (ref 3.8–5.2)
RBC # FLD: 14.3 % — SIGNIFICANT CHANGE UP (ref 10.3–14.5)
RBC BLD AUTO: SIGNIFICANT CHANGE UP
SMUDGE CELLS # BLD: PRESENT — SIGNIFICANT CHANGE UP
WBC # BLD: 15.19 K/UL — HIGH (ref 3.8–10.5)
WBC # FLD AUTO: 15.19 K/UL — HIGH (ref 3.8–10.5)

## 2025-05-29 PROCEDURE — 99214 OFFICE O/P EST MOD 30 MIN: CPT

## 2025-05-29 PROCEDURE — G2211 COMPLEX E/M VISIT ADD ON: CPT

## 2025-05-30 LAB
ALBUMIN SERPL ELPH-MCNC: 4.5 G/DL
ALP BLD-CCNC: 93 U/L
ALT SERPL-CCNC: 23 U/L
ANION GAP SERPL CALC-SCNC: 14 MMOL/L
AST SERPL-CCNC: 29 U/L
BILIRUB SERPL-MCNC: 1 MG/DL
BUN SERPL-MCNC: 24 MG/DL
CALCIUM SERPL-MCNC: 10 MG/DL
CHLORIDE SERPL-SCNC: 99 MMOL/L
CO2 SERPL-SCNC: 29 MMOL/L
CREAT SERPL-MCNC: 0.84 MG/DL
EGFRCR SERPLBLD CKD-EPI 2021: 73 ML/MIN/1.73M2
GLUCOSE SERPL-MCNC: 140 MG/DL
POTASSIUM SERPL-SCNC: 3.2 MMOL/L
PROT SERPL-MCNC: 6.8 G/DL
SODIUM SERPL-SCNC: 141 MMOL/L

## 2025-06-23 ENCOUNTER — APPOINTMENT (OUTPATIENT)
Dept: ENDOCRINOLOGY | Facility: CLINIC | Age: 75
End: 2025-06-23
Payer: MEDICARE

## 2025-06-23 VITALS — WEIGHT: 113 LBS | BODY MASS INDEX: 20.28 KG/M2 | HEIGHT: 62.6 IN

## 2025-06-23 VITALS
WEIGHT: 113 LBS | DIASTOLIC BLOOD PRESSURE: 70 MMHG | HEIGHT: 62 IN | OXYGEN SATURATION: 98 % | BODY MASS INDEX: 20.8 KG/M2 | HEART RATE: 65 BPM | SYSTOLIC BLOOD PRESSURE: 112 MMHG

## 2025-06-23 PROCEDURE — 77080 DXA BONE DENSITY AXIAL: CPT | Mod: GA

## 2025-06-23 PROCEDURE — G2211 COMPLEX E/M VISIT ADD ON: CPT

## 2025-06-23 PROCEDURE — 99214 OFFICE O/P EST MOD 30 MIN: CPT

## 2025-06-23 PROCEDURE — ZZZZZ: CPT

## 2025-09-03 ENCOUNTER — NON-APPOINTMENT (OUTPATIENT)
Age: 75
End: 2025-09-03

## 2025-09-03 DIAGNOSIS — D72.820 LYMPHOCYTOSIS (SYMPTOMATIC): ICD-10-CM

## 2025-09-03 DIAGNOSIS — E03.9 HYPOTHYROIDISM, UNSPECIFIED: ICD-10-CM

## 2025-09-03 DIAGNOSIS — C91.10 CHRONIC LYMPHOCYTIC LEUKEMIA OF B-CELL TYPE NOT HAVING ACHIEVED REMISSION: ICD-10-CM
